# Patient Record
Sex: MALE | Race: WHITE | NOT HISPANIC OR LATINO
[De-identification: names, ages, dates, MRNs, and addresses within clinical notes are randomized per-mention and may not be internally consistent; named-entity substitution may affect disease eponyms.]

---

## 2019-06-20 ENCOUNTER — APPOINTMENT (OUTPATIENT)
Dept: ORTHOPEDIC SURGERY | Facility: CLINIC | Age: 29
End: 2019-06-20
Payer: MEDICAID

## 2019-06-20 VITALS — HEART RATE: 89 BPM | SYSTOLIC BLOOD PRESSURE: 137 MMHG | DIASTOLIC BLOOD PRESSURE: 89 MMHG

## 2019-06-20 VITALS — WEIGHT: 315 LBS | HEIGHT: 69 IN | BODY MASS INDEX: 46.65 KG/M2

## 2019-06-20 DIAGNOSIS — Z78.9 OTHER SPECIFIED HEALTH STATUS: ICD-10-CM

## 2019-06-20 DIAGNOSIS — Z86.73 PERSONAL HISTORY OF TRANSIENT ISCHEMIC ATTACK (TIA), AND CEREBRAL INFARCTION W/OUT RESIDUAL DEFICITS: ICD-10-CM

## 2019-06-20 DIAGNOSIS — Z87.39 PERSONAL HISTORY OF OTHER DISEASES OF THE MUSCULOSKELETAL SYSTEM AND CONNECTIVE TISSUE: ICD-10-CM

## 2019-06-20 DIAGNOSIS — Z86.69 PERSONAL HISTORY OF OTHER DISEASES OF THE NERVOUS SYSTEM AND SENSE ORGANS: ICD-10-CM

## 2019-06-20 DIAGNOSIS — Z82.69 FAMILY HISTORY OF OTHER DISEASES OF THE MUSCULOSKELETAL SYSTEM AND CONNECTIVE TISSUE: ICD-10-CM

## 2019-06-20 DIAGNOSIS — Z56.0 UNEMPLOYMENT, UNSPECIFIED: ICD-10-CM

## 2019-06-20 PROCEDURE — 99204 OFFICE O/P NEW MOD 45 MIN: CPT

## 2019-06-20 PROCEDURE — 72100 X-RAY EXAM L-S SPINE 2/3 VWS: CPT

## 2019-06-20 SDOH — ECONOMIC STABILITY - INCOME SECURITY: UNEMPLOYMENT, UNSPECIFIED: Z56.0

## 2019-07-16 ENCOUNTER — APPOINTMENT (OUTPATIENT)
Dept: NEUROSURGERY | Facility: CLINIC | Age: 29
End: 2019-07-16
Payer: MEDICAID

## 2019-07-16 VITALS
HEIGHT: 69 IN | DIASTOLIC BLOOD PRESSURE: 109 MMHG | HEART RATE: 90 BPM | WEIGHT: 315 LBS | SYSTOLIC BLOOD PRESSURE: 140 MMHG | BODY MASS INDEX: 46.65 KG/M2

## 2019-07-16 PROCEDURE — 99204 OFFICE O/P NEW MOD 45 MIN: CPT

## 2019-07-16 NOTE — PHYSICAL EXAM
[General Appearance - Alert] : alert [Mood] : the mood was normal [Motor Strength] : muscle strength was normal in all four extremities [Sensation Tactile Decrease] : light touch was intact [2+] : Ankle jerk left 2+ [Straight-Leg Raise Test - Left] : straight leg raise of the left leg was negative [Straight-Leg Raise Test - Right] : straight leg raise  of the right leg was negative [Able to toe walk] : the patient was able to toe walk [Able to heel walk] : the patient was able to heel walk [No Spinal Tenderness] : no spinal tenderness [] : no rash

## 2019-07-16 NOTE — ASSESSMENT
[FreeTextEntry1] : 29 year old male with low back pain and lumbar radicular pain secondary to disc herniation.  Given the nature of the patient's complaints and lack of significant improvement following more conservative measures, we did discuss lumbar epidural steroid injection.  Risks, benefits, and expectations of the procedure were reviewed.  The patient was provided with an educational pamphlet outlining the details of the procedure so that he/she may have the ability to review the information prior to proceeding.  The patient has agreed to proceed and will follow up with me for the procedure.\par

## 2019-07-16 NOTE — CONSULT LETTER
[Dear  ___] : Dear ~BARBIE, [Consult Letter:] : I had the pleasure of evaluating your patient, [unfilled]. [Please see my note below.] : Please see my note below. [Consult Closing:] : Thank you very much for allowing me to participate in the care of this patient.  If you have any questions, please do not hesitate to contact me. [Sincerely,] : Sincerely, [FreeTextEntry2] : Brennon Arredondo MD\par [address]\[place] [address] [FreeTextEntry3] : Jesus

## 2019-07-16 NOTE — HISTORY OF PRESENT ILLNESS
[Back] : back [___ yrs] : [unfilled] year(s) ago [8] : a current pain level of 8/10 [10] : a maximum pain level of 10/10 [Dull] : dull [Right] : right [Anterior] : anterior aspect of the [Ankle] : ankle [Weakness] : weakness [Standing] : standing [Walking] : walking [Insomnia] : insomnia [PT] : PT [Chiropractor] : chiropractor [Medications] : medications [FreeTextEntry1] : M [FreeTextEntry2] : right leg [FreeTextEntry4] : leaning on something [FreeTextEntry6] : Nabumetone, Lyrica

## 2019-07-16 NOTE — DATA REVIEWED
[de-identified] : MRI lumbar spine done 5/11/19 showed evidence of L3-L4 disc herniation, L4-L5 disc herniation, L5-S1 disc herniation with S1 impingement [de-identified] : EMG/NCS done 5/23/19 showed evidence of bilateral L3, L4, and L5 radiculopathy

## 2019-07-24 ENCOUNTER — TRANSCRIPTION ENCOUNTER (OUTPATIENT)
Age: 29
End: 2019-07-24

## 2019-07-25 ENCOUNTER — OUTPATIENT (OUTPATIENT)
Dept: OUTPATIENT SERVICES | Facility: HOSPITAL | Age: 29
LOS: 1 days | End: 2019-07-25
Payer: MEDICAID

## 2019-07-25 ENCOUNTER — APPOINTMENT (OUTPATIENT)
Dept: NEUROSURGERY | Facility: CLINIC | Age: 29
End: 2019-07-25
Payer: MEDICAID

## 2019-07-25 DIAGNOSIS — M54.16 RADICULOPATHY, LUMBAR REGION: ICD-10-CM

## 2019-07-25 PROCEDURE — 62323 NJX INTERLAMINAR LMBR/SAC: CPT

## 2019-07-30 DIAGNOSIS — M54.17 RADICULOPATHY, LUMBOSACRAL REGION: ICD-10-CM

## 2019-08-21 ENCOUNTER — APPOINTMENT (OUTPATIENT)
Dept: NEUROSURGERY | Facility: CLINIC | Age: 29
End: 2019-08-21
Payer: MEDICAID

## 2019-08-21 VITALS
WEIGHT: 315 LBS | BODY MASS INDEX: 46.65 KG/M2 | DIASTOLIC BLOOD PRESSURE: 88 MMHG | HEIGHT: 69 IN | SYSTOLIC BLOOD PRESSURE: 141 MMHG | HEART RATE: 86 BPM

## 2019-08-21 DIAGNOSIS — M54.16 RADICULOPATHY, LUMBAR REGION: ICD-10-CM

## 2019-08-21 PROCEDURE — 99213 OFFICE O/P EST LOW 20 MIN: CPT

## 2019-08-21 NOTE — PHYSICAL EXAM
[Mood] : the mood was normal [General Appearance - Alert] : alert [FreeTextEntry1] : no redness, swelling, or tenderness at the injection site

## 2019-08-21 NOTE — REASON FOR VISIT
[Follow-Up: _____] : a [unfilled] follow-up visit [FreeTextEntry1] : Patient returns after his first LESI a few weeks ago.  Unfortunately, he does not feel any improvement following this procedure.  He is here to discuss his progress.

## 2019-08-21 NOTE — ASSESSMENT
[FreeTextEntry1] : 29 year old male with low back and lumbar radicular pain not improved following his first LESI.  He will contemplate whether or not to proceed with a repeat injection and follow up with me thereafter.

## 2019-09-10 ENCOUNTER — APPOINTMENT (OUTPATIENT)
Dept: ORTHOPEDIC SURGERY | Facility: CLINIC | Age: 29
End: 2019-09-10

## 2019-10-03 ENCOUNTER — APPOINTMENT (OUTPATIENT)
Dept: ORTHOPEDIC SURGERY | Facility: CLINIC | Age: 29
End: 2019-10-03

## 2019-10-17 ENCOUNTER — APPOINTMENT (OUTPATIENT)
Dept: ORTHOPEDIC SURGERY | Facility: CLINIC | Age: 29
End: 2019-10-17

## 2019-10-31 ENCOUNTER — APPOINTMENT (OUTPATIENT)
Dept: ORTHOPEDIC SURGERY | Facility: CLINIC | Age: 29
End: 2019-10-31
Payer: MEDICAID

## 2019-10-31 PROCEDURE — 99215 OFFICE O/P EST HI 40 MIN: CPT

## 2019-11-15 ENCOUNTER — FORM ENCOUNTER (OUTPATIENT)
Age: 29
End: 2019-11-15

## 2019-11-16 ENCOUNTER — APPOINTMENT (OUTPATIENT)
Dept: MRI IMAGING | Facility: CLINIC | Age: 29
End: 2019-11-16
Payer: MEDICAID

## 2019-11-16 ENCOUNTER — OUTPATIENT (OUTPATIENT)
Dept: OUTPATIENT SERVICES | Facility: HOSPITAL | Age: 29
LOS: 1 days | End: 2019-11-16
Payer: MEDICAID

## 2019-11-16 DIAGNOSIS — M51.26 OTHER INTERVERTEBRAL DISC DISPLACEMENT, LUMBAR REGION: ICD-10-CM

## 2019-11-16 PROCEDURE — 72148 MRI LUMBAR SPINE W/O DYE: CPT | Mod: 26

## 2019-11-16 PROCEDURE — 72148 MRI LUMBAR SPINE W/O DYE: CPT

## 2019-11-22 ENCOUNTER — APPOINTMENT (OUTPATIENT)
Dept: ORTHOPEDIC SURGERY | Facility: CLINIC | Age: 29
End: 2019-11-22
Payer: MEDICAID

## 2019-11-22 DIAGNOSIS — M48.07 SPINAL STENOSIS, LUMBOSACRAL REGION: ICD-10-CM

## 2019-11-22 DIAGNOSIS — M51.26 OTHER INTERVERTEBRAL DISC DISPLACEMENT, LUMBAR REGION: ICD-10-CM

## 2019-11-22 PROCEDURE — 99215 OFFICE O/P EST HI 40 MIN: CPT

## 2019-11-22 NOTE — DISCUSSION/SUMMARY
[de-identified] : 30 yo male with multilevel disc protrusion and spinal stenosis from L3-L4 through L45 causing central canal stenosis, epidural lipomatosis, , recommend lumbar laminectomy L3-L5 as he has failed PT, NSAIDS, and TACO. \par \par \par I reviewed all major risks, benefits, and complications associated with lumbar laminectomy and/or microdiscectomy including but not limited to bleeding, infection, CSF leak, neurological injury, chronic pain, reherniation, hematoma worsening of pain, anesthetic risk, chronic pain and disability, need for further surgical intervention, medical complications (GI, , DVT, PE, cardiac, pulmonary, etc) and risk of mortality.\par \par \par Diagnosis, prognosis, natural history and treatment was discussed with patient. Patient was advised if the following symptoms develop: chills, fever, loss of bladder control, bladder incontinence or urinary retention, numbness/tingling or weakness is present in upper or lower extremities, to go to the nearest ER and to call the office immediately to inform us.\par \par

## 2019-11-22 NOTE — HISTORY OF PRESENT ILLNESS
[de-identified] : Pt with no changes in his symptomatology since last office visit on 10/31/19 who has L34, L45 HNP, lateral recess stenosis, epidural lipomatosis. He presents today for f/u. Pt had LESI on 07/25/19, performed by Dr Hurst, this provided no relief in pain\par Pain radiates to RLE to ankle, weakness, Pt denies numbness/tingling. Pt takes Lyrica, nabumetone, these provides no relief in pain. Pt completed PT 1 yr ago, this caused exacerbation of pain. Pt denies having TACO in the past. Pt denies Loss of bladder control, bowel incontinence or urinary retention or saddle anesthesia\par \par Pain levels include an average pain level of 8/10, a minimum pain level of 7/10 and a maximum pain level of 9/10. \par He states the symptoms seem to be constant. \par \par Modifying factors -. rainy weather exacerbates pain. No relieving factors are noted. \par Associated Symptoms: no ataxia, no incontinence, good dexterity and no urinary retention.

## 2019-11-22 NOTE — PHYSICAL EXAM
[SLR] : positive straight leg raise [NL] : Motor strength of the left lower extremity was normal [Motor Strength Lower Extremities] : right (5/5) [] : Sensory: [L3-RT] : L3 [L4-RT] : L4 [L5-RT] : L5 [0] : left ankle jerk 0 [ALL] : dorsalis pedis, posterior tibial, femoral, popliteal, and radial 2+ and symmetric bilaterally [Obese] : obese [Normal] : Oriented to person, place, and time, insight and judgement were intact and the affect was normal [Jaimes's Sign] : negative Jaimes's sign [Pronator Drift] : negative pronator drift [de-identified] : Lumbar ROM limited\par NTTP L spine and b/l paraspinals L region. \par Skin intact L spine. No rashes, ulcers, blisters. \par No lymphedema. \par \par  [de-identified] : EXAM: MR SPINE LUMBAR \par \par \par PROCEDURE DATE: 11/16/2019 \par \par \par INTERPRETATION: \par LUMBOSACRAL SPINE MRI \par \par CLINICAL INFORMATION: Low back pain and right leg pain. \par TECHNIQUE: Multiplanar, multisequence MR imaging was obtained of the \par lumbosacral spine. \par FINDINGS: \par \par DISC LEVEL EVALUATION: \par \par L1/L2: Normal \par L2/L3: Small posterior disc protrusion without central canal or foraminal \par narrowing. \par L3/L4: There is a small to moderate central disc protrusion with associated \par annular fissuring causing mild to moderate central canal stenosis. \par L4/L5: There is a small to moderate central disc protrusion with associated \par annular fissuring causing mild central canal stenosis. There is minimal \par foraminal narrowing. Mild facet arthrosis is present. \par L5/S1: There is a small to moderate broad-based posterior disc protrusion \par that minimally indents the ventral thecal sac causing minimal central canal \par stenosis. There is no foraminal narrowing. \par \par SPINAL ALIGNMENT: Normal \par DISTAL CORD AND CONUS: The distal cord is normal in appearance. The conus \par terminates at L1 and is unremarkable. \par SI JOINTS: Normal \par MARROW: There is no bone marrow edema or fracture. \par PARASPINAL MUSCLE AND SOFT TISSUES: Normal \par INTRAABDOMINAL/INTRAPELVIC SOFT TISSUES: Normal \par \par IMPRESSION: Multilevel disc protrusion from L3-L4 through L5-S1 causing \par central canal stenosis as detailed above. \par \par \par PUNEET VALDEZ M.D., ATTENDING RADIOLOGIST Phone #: [phone] \par This document has been electronically signed. Nov 18 2019 12:54PM \par

## 2019-11-29 ENCOUNTER — RX RENEWAL (OUTPATIENT)
Age: 29
End: 2019-11-29

## 2019-11-29 RX ORDER — NABUMETONE 750 MG/1
750 TABLET, FILM COATED ORAL
Qty: 28 | Refills: 0 | Status: ACTIVE | COMMUNITY
Start: 1900-01-01 | End: 1900-01-01

## 2019-11-29 RX ORDER — PREGABALIN 50 MG/1
50 CAPSULE ORAL 3 TIMES DAILY
Qty: 42 | Refills: 0 | Status: ACTIVE | COMMUNITY
Start: 1900-01-01 | End: 1900-01-01

## 2019-12-09 ENCOUNTER — RX RENEWAL (OUTPATIENT)
Age: 29
End: 2019-12-09

## 2019-12-10 ENCOUNTER — APPOINTMENT (OUTPATIENT)
Dept: ORTHOPEDIC SURGERY | Facility: CLINIC | Age: 29
End: 2019-12-10

## 2020-01-06 ENCOUNTER — OUTPATIENT (OUTPATIENT)
Dept: OUTPATIENT SERVICES | Facility: HOSPITAL | Age: 30
LOS: 1 days | End: 2020-01-06
Payer: MEDICAID

## 2020-01-06 VITALS
WEIGHT: 315 LBS | HEIGHT: 70 IN | SYSTOLIC BLOOD PRESSURE: 140 MMHG | RESPIRATION RATE: 16 BRPM | DIASTOLIC BLOOD PRESSURE: 85 MMHG | OXYGEN SATURATION: 98 % | HEART RATE: 89 BPM | TEMPERATURE: 99 F

## 2020-01-06 DIAGNOSIS — Z01.818 ENCOUNTER FOR OTHER PREPROCEDURAL EXAMINATION: ICD-10-CM

## 2020-01-06 DIAGNOSIS — Z90.89 ACQUIRED ABSENCE OF OTHER ORGANS: Chronic | ICD-10-CM

## 2020-01-06 DIAGNOSIS — M53.86 OTHER SPECIFIED DORSOPATHIES, LUMBAR REGION: ICD-10-CM

## 2020-01-06 DIAGNOSIS — Z87.438 PERSONAL HISTORY OF OTHER DISEASES OF MALE GENITAL ORGANS: Chronic | ICD-10-CM

## 2020-01-06 DIAGNOSIS — M48.07 SPINAL STENOSIS, LUMBOSACRAL REGION: ICD-10-CM

## 2020-01-06 DIAGNOSIS — Z29.9 ENCOUNTER FOR PROPHYLACTIC MEASURES, UNSPECIFIED: ICD-10-CM

## 2020-01-06 DIAGNOSIS — E66.01 MORBID (SEVERE) OBESITY DUE TO EXCESS CALORIES: ICD-10-CM

## 2020-01-06 LAB
ANION GAP SERPL CALC-SCNC: 11 MMOL/L — SIGNIFICANT CHANGE UP (ref 5–17)
BUN SERPL-MCNC: 15 MG/DL — SIGNIFICANT CHANGE UP (ref 7–23)
CALCIUM SERPL-MCNC: 9.4 MG/DL — SIGNIFICANT CHANGE UP (ref 8.4–10.5)
CHLORIDE SERPL-SCNC: 99 MMOL/L — SIGNIFICANT CHANGE UP (ref 96–108)
CO2 SERPL-SCNC: 26 MMOL/L — SIGNIFICANT CHANGE UP (ref 22–31)
CREAT SERPL-MCNC: 1.01 MG/DL — SIGNIFICANT CHANGE UP (ref 0.5–1.3)
GLUCOSE SERPL-MCNC: 109 MG/DL — HIGH (ref 70–99)
HCT VFR BLD CALC: 44.9 % — SIGNIFICANT CHANGE UP (ref 39–50)
HGB BLD-MCNC: 14.6 G/DL — SIGNIFICANT CHANGE UP (ref 13–17)
MCHC RBC-ENTMCNC: 27.3 PG — SIGNIFICANT CHANGE UP (ref 27–34)
MCHC RBC-ENTMCNC: 32.5 GM/DL — SIGNIFICANT CHANGE UP (ref 32–36)
MCV RBC AUTO: 83.9 FL — SIGNIFICANT CHANGE UP (ref 80–100)
PLATELET # BLD AUTO: 264 K/UL — SIGNIFICANT CHANGE UP (ref 150–400)
POTASSIUM SERPL-MCNC: 4.2 MMOL/L — SIGNIFICANT CHANGE UP (ref 3.5–5.3)
POTASSIUM SERPL-SCNC: 4.2 MMOL/L — SIGNIFICANT CHANGE UP (ref 3.5–5.3)
RBC # BLD: 5.35 M/UL — SIGNIFICANT CHANGE UP (ref 4.2–5.8)
RBC # FLD: 12.1 % — SIGNIFICANT CHANGE UP (ref 10.3–14.5)
SODIUM SERPL-SCNC: 136 MMOL/L — SIGNIFICANT CHANGE UP (ref 135–145)
WBC # BLD: 8.56 K/UL — SIGNIFICANT CHANGE UP (ref 3.8–10.5)
WBC # FLD AUTO: 8.56 K/UL — SIGNIFICANT CHANGE UP (ref 3.8–10.5)

## 2020-01-06 PROCEDURE — 87641 MR-STAPH DNA AMP PROBE: CPT

## 2020-01-06 PROCEDURE — 80048 BASIC METABOLIC PNL TOTAL CA: CPT

## 2020-01-06 PROCEDURE — G0463: CPT

## 2020-01-06 PROCEDURE — 87640 STAPH A DNA AMP PROBE: CPT

## 2020-01-06 PROCEDURE — 85027 COMPLETE CBC AUTOMATED: CPT

## 2020-01-06 RX ORDER — CEFAZOLIN SODIUM 1 G
3000 VIAL (EA) INJECTION ONCE
Refills: 0 | Status: DISCONTINUED | OUTPATIENT
Start: 2020-01-18 | End: 2020-01-19

## 2020-01-06 RX ORDER — SODIUM CHLORIDE 9 MG/ML
3 INJECTION INTRAMUSCULAR; INTRAVENOUS; SUBCUTANEOUS EVERY 8 HOURS
Refills: 0 | Status: DISCONTINUED | OUTPATIENT
Start: 2020-01-18 | End: 2020-01-18

## 2020-01-06 RX ORDER — LIDOCAINE HCL 20 MG/ML
0.2 VIAL (ML) INJECTION ONCE
Refills: 0 | Status: DISCONTINUED | OUTPATIENT
Start: 2020-01-18 | End: 2020-01-18

## 2020-01-06 RX ORDER — CHLORHEXIDINE GLUCONATE 213 G/1000ML
1 SOLUTION TOPICAL ONCE
Refills: 0 | Status: DISCONTINUED | OUTPATIENT
Start: 2020-01-18 | End: 2020-01-18

## 2020-01-06 NOTE — H&P PST ADULT - HISTORY OF PRESENT ILLNESS
29 yr old male reports worsening low back pain over past 3 years, pain radiation to right leg, able to walk, perform moderate house work , presents to 29 yr old male, obese , BMI 46.8,  reports worsening low back pain over past 3 years, pain radiation to right leg, able to walk without assistance, perform moderate house work , limited daily activity,  presents to PST for scheduled L3-L5 right hemilaminotomy and discectomy on 1/18/2019. Denies fever, chills, no acute complaints.

## 2020-01-06 NOTE — H&P PST ADULT - ASSESSMENT
CHACORTAI VTE 2.0 SCORE [CLOT updated 2019]    AGE RELATED RISK FACTORS                                                       MOBILITY RELATED FACTORS  [x ] Age 41-60 years                                            (1 Point)                    [ ] Bed rest                                                        (1 Point)  [ ] Age: 61-74 years                                           (2 Points)                  [ ] Plaster cast                                                   (2 Points)  [ ] Age= 75 years                                              (3 Points)                    [ ] Bed bound for more than 72 hours                 (2 Points)    DISEASE RELATED RISK FACTORS                                               GENDER SPECIFIC FACTORS  [ ] Edema in the lower extremities                       (1 Point)              [ ] Pregnancy                                                     (1 Point)  [ ] Varicose veins                                               (1 Point)                     [ ] Post-partum < 6 weeks                                   (1 Point)             [x ] BMI > 25 Kg/m2                                            (1 Point)                     [ ] Hormonal therapy  or oral contraception          (1 Point)                 [ ] Sepsis (in the previous month)                        (1 Point)               [ ] History of pregnancy complications                 (1 point)  [ ] Pneumonia or serious lung disease                                               [ ] Unexplained or recurrent                     (1 Point)           (in the previous month)                               (1 Point)  [ ] Abnormal pulmonary function test                     (1 Point)                 SURGERY RELATED RISK FACTORS  [ ] Acute myocardial infarction                              (1 Point)               [ ]  Section                                             (1 Point)  [ ] Congestive heart failure (in the previous month)  (1 Point)      [ ] Minor surgery                                                  (1 Point)   [ ] Inflammatory bowel disease                             (1 Point)               [ ] Arthroscopic surgery                                        (2 Points)  [ ] Central venous access                                      (2 Points)                [x ] General surgery lasting more than 45 minutes (2 points)  [ ] Malignancy- Present or previous                   (2 Points)                [ ] Elective arthroplasty                                         (5 points)    [ ] Stroke (in the previous month)                          (5 Points)                                                                                                                                                           HEMATOLOGY RELATED FACTORS                                                 TRAUMA RELATED RISK FACTORS  [ ] Prior episodes of VTE                                     (3 Points)                [ ] Fracture of the hip, pelvis, or leg                       (5 Points)  [ ] Positive family history for VTE                         (3 Points)             [ ] Acute spinal cord injury (in the previous month)  (5 Points)  [ ] Prothrombin 23309 A                                     (3 Points)               [ ] Paralysis  (less than 1 month)                             (5 Points)  [ ] Factor V Leiden                                             (3 Points)                  [ ] Multiple Trauma within 1 month                        (5 Points)  [ ] Lupus anticoagulants                                     (3 Points)                                                           [ ] Anticardiolipin antibodies                               (3 Points)                                                       [ ] High homocysteine in the blood                      (3 Points)                                             [ ] Other congenital or acquired thrombophilia      (3 Points)                                                [ ] Heparin induced thrombocytopenia                  (3 Points)                                     Total Score [      4    ]

## 2020-01-06 NOTE — H&P PST ADULT - NSICDXPROBLEM_GEN_ALL_CORE_FT
PROBLEM DIAGNOSES  Problem: Disorder of lumbar spine  Assessment and Plan: L3-L5 right hemilaminotomy and discectomy  PST instructions provided, surgical scrub given, questions answered, patient verbalized understanding.   CBC, BMP, MRSA collected and sent.     Problem: Need for prophylactic measure  Assessment and Plan: The Caprini score indicates this patient is at risk for a VTE event (score 3-5).  Most surgical patients in this group would benefit from pharmacologic prophylaxis.  The surgical team will determine the balance between VTE risk and bleeding risk PROBLEM DIAGNOSES  Problem: Disorder of lumbar spine  Assessment and Plan: L3-L5 right hemilaminotomy and discectomy  PST instructions provided, surgical scrub given, questions answered, patient verbalized understanding.   CBC, BMP, MRSA collected and sent.     Problem: Severe obesity (BMI >= 40)  Assessment and Plan: BMI 46.8  OR booking notified     Problem: Need for prophylactic measure  Assessment and Plan: The Caprini score indicates this patient is at risk for a VTE event (score 3-5).  Most surgical patients in this group would benefit from pharmacologic prophylaxis.  The surgical team will determine the balance between VTE risk and bleeding risk PROBLEM DIAGNOSES  Problem: Disorder of lumbar spine  Assessment and Plan: L3-L5 right hemilaminotomy and discectomy  PST instructions provided, surgical scrub given, questions answered, patient verbalized understanding.   CBC, BMP, MRSA collected and sent.  Will obtain PCP and neurology last visit notes      Problem: Severe obesity (BMI >= 40)  Assessment and Plan: BMI 46.8  OR booking notified     Problem: Need for prophylactic measure  Assessment and Plan: The Caprini score indicates this patient is at risk for a VTE event (score 3-5).  Most surgical patients in this group would benefit from pharmacologic prophylaxis.  The surgical team will determine the balance between VTE risk and bleeding risk

## 2020-01-06 NOTE — H&P PST ADULT - NSICDXPASTMEDICALHX_GEN_ALL_CORE_FT
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

## 2020-01-06 NOTE — H&P PST ADULT - NSANTHOSAYNRD_GEN_A_CORE
No. JORGE screening performed.  STOP BANG Legend: 0-2 = LOW Risk; 3-4 = INTERMEDIATE Risk; 5-8 = HIGH Risk neck 18 inches/No. JORGE screening performed.  STOP BANG Legend: 0-2 = LOW Risk; 3-4 = INTERMEDIATE Risk; 5-8 = HIGH Risk

## 2020-01-06 NOTE — H&P PST ADULT - PRIMARY CARE PROVIDER
Dr. Jamie Larkin [phone] last visit 12/16/19 Dr. Jamie Larkin [phone] last visit 12/16/19, no preop visit

## 2020-01-06 NOTE — H&P PST ADULT - ATTENDING COMMENTS
28 yo male with multilevel disc protrusion and spinal stenosis from L3-L4 through L45 causing central canal stenosis, epidural lipomatosis, , recommend lumbar laminectomy L3-L5 as he has failed PT, NSAIDS, and TACO.       I reviewed all major risks, benefits, and complications associated with lumbar laminectomy and/or microdiscectomy including but not limited to bleeding, infection, CSF leak, neurological injury, chronic pain, reherniation, hematoma worsening of pain, anesthetic risk, chronic pain and disability, need for further surgical intervention, medical complications (GI, , DVT, PE, cardiac, pulmonary, etc) and risk of mortality.

## 2020-01-06 NOTE — H&P PST ADULT - ENERGY EXPENDITURE (METS)
3 yr ago, was very active, activity decreased over past years due to pain 3 yr ago, was very active, activity decreased over past years due to pain, DASI score  now  4.64

## 2020-01-07 ENCOUNTER — RX RENEWAL (OUTPATIENT)
Age: 30
End: 2020-01-07

## 2020-01-07 LAB
MRSA PCR RESULT.: SIGNIFICANT CHANGE UP
S AUREUS DNA NOSE QL NAA+PROBE: DETECTED

## 2020-01-07 RX ORDER — MUPIROCIN 20 MG/G
2 OINTMENT TOPICAL
Qty: 2 | Refills: 0 | Status: ACTIVE | COMMUNITY
Start: 2020-01-07 | End: 1900-01-01

## 2020-01-17 ENCOUNTER — TRANSCRIPTION ENCOUNTER (OUTPATIENT)
Age: 30
End: 2020-01-17

## 2020-01-18 ENCOUNTER — APPOINTMENT (OUTPATIENT)
Dept: ORTHOPEDIC SURGERY | Facility: HOSPITAL | Age: 30
End: 2020-01-18

## 2020-01-18 ENCOUNTER — RESULT REVIEW (OUTPATIENT)
Age: 30
End: 2020-01-18

## 2020-01-18 ENCOUNTER — INPATIENT (INPATIENT)
Facility: HOSPITAL | Age: 30
LOS: 0 days | Discharge: HOME | DRG: 519 | End: 2020-01-19
Attending: ORTHOPAEDIC SURGERY | Admitting: ORTHOPAEDIC SURGERY
Payer: MEDICAID

## 2020-01-18 VITALS
RESPIRATION RATE: 18 BRPM | SYSTOLIC BLOOD PRESSURE: 131 MMHG | WEIGHT: 315 LBS | TEMPERATURE: 97 F | OXYGEN SATURATION: 100 % | HEART RATE: 91 BPM | DIASTOLIC BLOOD PRESSURE: 89 MMHG | HEIGHT: 70 IN

## 2020-01-18 DIAGNOSIS — Z87.438 PERSONAL HISTORY OF OTHER DISEASES OF MALE GENITAL ORGANS: Chronic | ICD-10-CM

## 2020-01-18 DIAGNOSIS — Z90.89 ACQUIRED ABSENCE OF OTHER ORGANS: Chronic | ICD-10-CM

## 2020-01-18 DIAGNOSIS — M48.07 SPINAL STENOSIS, LUMBOSACRAL REGION: ICD-10-CM

## 2020-01-18 LAB
ANION GAP SERPL CALC-SCNC: 12 MMOL/L — SIGNIFICANT CHANGE UP (ref 5–17)
BASOPHILS # BLD AUTO: 0.02 K/UL — SIGNIFICANT CHANGE UP (ref 0–0.2)
BASOPHILS NFR BLD AUTO: 0.2 % — SIGNIFICANT CHANGE UP (ref 0–2)
BLD GP AB SCN SERPL QL: NEGATIVE — SIGNIFICANT CHANGE UP
BUN SERPL-MCNC: 16 MG/DL — SIGNIFICANT CHANGE UP (ref 7–23)
CALCIUM SERPL-MCNC: 8.5 MG/DL — SIGNIFICANT CHANGE UP (ref 8.4–10.5)
CHLORIDE SERPL-SCNC: 101 MMOL/L — SIGNIFICANT CHANGE UP (ref 96–108)
CO2 SERPL-SCNC: 23 MMOL/L — SIGNIFICANT CHANGE UP (ref 22–31)
CREAT SERPL-MCNC: 1.11 MG/DL — SIGNIFICANT CHANGE UP (ref 0.5–1.3)
EOSINOPHIL # BLD AUTO: 0.02 K/UL — SIGNIFICANT CHANGE UP (ref 0–0.5)
EOSINOPHIL NFR BLD AUTO: 0.2 % — SIGNIFICANT CHANGE UP (ref 0–6)
GLUCOSE SERPL-MCNC: 164 MG/DL — HIGH (ref 70–99)
HCT VFR BLD CALC: 41 % — SIGNIFICANT CHANGE UP (ref 39–50)
HGB BLD-MCNC: 13.5 G/DL — SIGNIFICANT CHANGE UP (ref 13–17)
IMM GRANULOCYTES NFR BLD AUTO: 0.5 % — SIGNIFICANT CHANGE UP (ref 0–1.5)
LYMPHOCYTES # BLD AUTO: 0.82 K/UL — LOW (ref 1–3.3)
LYMPHOCYTES # BLD AUTO: 10.1 % — LOW (ref 13–44)
MCHC RBC-ENTMCNC: 27.2 PG — SIGNIFICANT CHANGE UP (ref 27–34)
MCHC RBC-ENTMCNC: 32.9 GM/DL — SIGNIFICANT CHANGE UP (ref 32–36)
MCV RBC AUTO: 82.5 FL — SIGNIFICANT CHANGE UP (ref 80–100)
MONOCYTES # BLD AUTO: 0.16 K/UL — SIGNIFICANT CHANGE UP (ref 0–0.9)
MONOCYTES NFR BLD AUTO: 2 % — SIGNIFICANT CHANGE UP (ref 2–14)
NEUTROPHILS # BLD AUTO: 7.05 K/UL — SIGNIFICANT CHANGE UP (ref 1.8–7.4)
NEUTROPHILS NFR BLD AUTO: 87 % — HIGH (ref 43–77)
NRBC # BLD: 0 /100 WBCS — SIGNIFICANT CHANGE UP (ref 0–0)
PLATELET # BLD AUTO: 246 K/UL — SIGNIFICANT CHANGE UP (ref 150–400)
POTASSIUM SERPL-MCNC: 4.3 MMOL/L — SIGNIFICANT CHANGE UP (ref 3.5–5.3)
POTASSIUM SERPL-SCNC: 4.3 MMOL/L — SIGNIFICANT CHANGE UP (ref 3.5–5.3)
RBC # BLD: 4.97 M/UL — SIGNIFICANT CHANGE UP (ref 4.2–5.8)
RBC # FLD: 12 % — SIGNIFICANT CHANGE UP (ref 10.3–14.5)
RH IG SCN BLD-IMP: POSITIVE — SIGNIFICANT CHANGE UP
RH IG SCN BLD-IMP: POSITIVE — SIGNIFICANT CHANGE UP
SODIUM SERPL-SCNC: 136 MMOL/L — SIGNIFICANT CHANGE UP (ref 135–145)
WBC # BLD: 8.11 K/UL — SIGNIFICANT CHANGE UP (ref 3.8–10.5)
WBC # FLD AUTO: 8.11 K/UL — SIGNIFICANT CHANGE UP (ref 3.8–10.5)

## 2020-01-18 PROCEDURE — 63047 LAM FACETEC & FORAMOT LUMBAR: CPT | Mod: 22

## 2020-01-18 PROCEDURE — 63048 LAM FACETEC &FORAMOT EA ADDL: CPT | Mod: 22

## 2020-01-18 PROCEDURE — 88311 DECALCIFY TISSUE: CPT | Mod: 26

## 2020-01-18 PROCEDURE — 72100 X-RAY EXAM L-S SPINE 2/3 VWS: CPT | Mod: 26

## 2020-01-18 PROCEDURE — 88304 TISSUE EXAM BY PATHOLOGIST: CPT | Mod: 26

## 2020-01-18 RX ORDER — ACETAMINOPHEN 500 MG
975 TABLET ORAL EVERY 6 HOURS
Refills: 0 | Status: DISCONTINUED | OUTPATIENT
Start: 2020-01-18 | End: 2020-01-19

## 2020-01-18 RX ORDER — SENNA PLUS 8.6 MG/1
2 TABLET ORAL AT BEDTIME
Refills: 0 | Status: DISCONTINUED | OUTPATIENT
Start: 2020-01-18 | End: 2020-01-19

## 2020-01-18 RX ORDER — CYCLOBENZAPRINE HYDROCHLORIDE 10 MG/1
10 TABLET, FILM COATED ORAL THREE TIMES A DAY
Refills: 0 | Status: DISCONTINUED | OUTPATIENT
Start: 2020-01-18 | End: 2020-01-19

## 2020-01-18 RX ORDER — OXYCODONE HYDROCHLORIDE 5 MG/1
10 TABLET ORAL EVERY 4 HOURS
Refills: 0 | Status: DISCONTINUED | OUTPATIENT
Start: 2020-01-18 | End: 2020-01-19

## 2020-01-18 RX ORDER — DEXAMETHASONE 0.5 MG/5ML
1 ELIXIR ORAL
Refills: 0 | Status: DISCONTINUED | OUTPATIENT
Start: 2020-01-18 | End: 2020-01-19

## 2020-01-18 RX ORDER — SODIUM CHLORIDE 9 MG/ML
1000 INJECTION, SOLUTION INTRAVENOUS
Refills: 0 | Status: DISCONTINUED | OUTPATIENT
Start: 2020-01-18 | End: 2020-01-19

## 2020-01-18 RX ORDER — OXYCODONE HYDROCHLORIDE 5 MG/1
5 TABLET ORAL EVERY 4 HOURS
Refills: 0 | Status: DISCONTINUED | OUTPATIENT
Start: 2020-01-18 | End: 2020-01-19

## 2020-01-18 RX ORDER — NABUMETONE 750 MG
2 TABLET ORAL
Qty: 0 | Refills: 0 | DISCHARGE

## 2020-01-18 RX ORDER — LANOLIN ALCOHOL/MO/W.PET/CERES
3 CREAM (GRAM) TOPICAL AT BEDTIME
Refills: 0 | Status: DISCONTINUED | OUTPATIENT
Start: 2020-01-18 | End: 2020-01-19

## 2020-01-18 RX ORDER — ACETAMINOPHEN 500 MG
1000 TABLET ORAL EVERY 6 HOURS
Refills: 0 | Status: COMPLETED | OUTPATIENT
Start: 2020-01-18 | End: 2020-01-18

## 2020-01-18 RX ORDER — FENTANYL CITRATE 50 UG/ML
25 INJECTION INTRAVENOUS
Refills: 0 | Status: DISCONTINUED | OUTPATIENT
Start: 2020-01-18 | End: 2020-01-18

## 2020-01-18 RX ORDER — PANTOPRAZOLE SODIUM 20 MG/1
40 TABLET, DELAYED RELEASE ORAL DAILY
Refills: 0 | Status: DISCONTINUED | OUTPATIENT
Start: 2020-01-18 | End: 2020-01-19

## 2020-01-18 RX ORDER — MAGNESIUM HYDROXIDE 400 MG/1
30 TABLET, CHEWABLE ORAL EVERY 12 HOURS
Refills: 0 | Status: DISCONTINUED | OUTPATIENT
Start: 2020-01-18 | End: 2020-01-19

## 2020-01-18 RX ORDER — BENZOCAINE AND MENTHOL 5; 1 G/100ML; G/100ML
1 LIQUID ORAL
Refills: 0 | Status: DISCONTINUED | OUTPATIENT
Start: 2020-01-18 | End: 2020-01-19

## 2020-01-18 RX ORDER — TRAMADOL HYDROCHLORIDE 50 MG/1
50 TABLET ORAL EVERY 6 HOURS
Refills: 0 | Status: DISCONTINUED | OUTPATIENT
Start: 2020-01-18 | End: 2020-01-19

## 2020-01-18 RX ORDER — DEXAMETHASONE 0.5 MG/5ML
3 ELIXIR ORAL
Refills: 0 | Status: DISCONTINUED | OUTPATIENT
Start: 2020-01-18 | End: 2020-01-19

## 2020-01-18 RX ORDER — CEFAZOLIN SODIUM 1 G
3000 VIAL (EA) INJECTION EVERY 8 HOURS
Refills: 0 | Status: COMPLETED | OUTPATIENT
Start: 2020-01-18 | End: 2020-01-19

## 2020-01-18 RX ORDER — ONDANSETRON 8 MG/1
4 TABLET, FILM COATED ORAL EVERY 8 HOURS
Refills: 0 | Status: DISCONTINUED | OUTPATIENT
Start: 2020-01-18 | End: 2020-01-18

## 2020-01-18 RX ORDER — DEXAMETHASONE 0.5 MG/5ML
5 ELIXIR ORAL
Refills: 0 | Status: COMPLETED | OUTPATIENT
Start: 2020-01-18 | End: 2020-01-19

## 2020-01-18 RX ADMIN — Medication 400 MILLIGRAM(S): at 21:14

## 2020-01-18 RX ADMIN — SODIUM CHLORIDE 125 MILLILITER(S): 9 INJECTION, SOLUTION INTRAVENOUS at 17:39

## 2020-01-18 RX ADMIN — Medication 101 MILLIGRAM(S): at 17:35

## 2020-01-18 RX ADMIN — SENNA PLUS 2 TABLET(S): 8.6 TABLET ORAL at 21:19

## 2020-01-18 RX ADMIN — Medication 75 MILLIGRAM(S): at 17:38

## 2020-01-18 RX ADMIN — Medication 101 MILLIGRAM(S): at 22:34

## 2020-01-18 RX ADMIN — Medication 200 MILLIGRAM(S): at 19:59

## 2020-01-18 RX ADMIN — Medication 1000 MILLIGRAM(S): at 21:45

## 2020-01-18 NOTE — PROGRESS NOTE ADULT - ASSESSMENT
Pt is a 29y Male POD 0 from RLE Hemilaminotomy.  -Stable  -Tolerated procedure well  -Pain Control  -Hold all chemical DVT PPx in setting of spinal surgery  -SCDs  -Taper  -Encourage Incentive Spirometry  -WBAT  -PT/OT  -Med Management  -Dispo Planning--home tomorrow  -Will discuss with attending and advise if plan changes    Sarai Carson M.D.  PGY-2 Orthopaedic Surgery

## 2020-01-18 NOTE — PRE-OP CHECKLIST - HAIR REMOVAL
Detail Level: Simple
hair removal not indicated
Instructions (Optional): Continue with treatment as planned 10/24/2017

## 2020-01-18 NOTE — PRE-ANESTHESIA EVALUATION ADULT - NSANTHPMHFT_GEN_ALL_CORE
29 yr old male.  Pmhx obese (BMI 46.8), seizure hx, pseudomotor cerebri.  Reports worsening low back pain over past 3 years, pain radiation to right leg, able to walk without assistance, perform moderate house work , limited daily activity.  Presents for scheduled L3-L5 right hemilaminotomy and discectomy. 29 yr old male.  Pmhx obese (BMI 46.8), ?CVA and seizures as a baby and no seizures since age 5 and not currently on anti seizure meds.  Reports worsening low back pain over past 3 years, pain radiation to right leg, able to walk without assistance, perform moderate house work , limited daily activity.  Presents for scheduled L3-L5 right hemilaminotomy and discectomy.

## 2020-01-18 NOTE — PRE-ANESTHESIA EVALUATION ADULT - NSANTHOSAYNRD_GEN_A_CORE
No. JORGE screening performed.  STOP BANG Legend: 0-2 = LOW Risk; 3-4 = INTERMEDIATE Risk; 5-8 = HIGH Risk/neck 18 inches

## 2020-01-18 NOTE — PROGRESS NOTE ADULT - SUBJECTIVE AND OBJECTIVE BOX
Orthopaedic Surgery Post-Operative Progress Note    Pt reports pain is well controlled. Tolerated procedure well. Denies fevers, dizziness, CP, SOB, N/V, numbness/tingling, weakness, calf pain.    Labs:                        13.5   8.11  )-----------( 246      ( 18 Jan 2020 16:39 )             41.0     01-18    136  |  101  |  16  ----------------------------<  164<H>  4.3   |  23  |  1.11    Ca    8.5      18 Jan 2020 16:39    Vitals:  T(C): 37.1 (01-18-20 @ 22:04), Max: 37.1 (01-18-20 @ 22:04)  HR: 101 (01-18-20 @ 22:04) (88 - 106)  BP: 120/67 (01-18-20 @ 22:04) (117/95 - 142/91)  RR: 18 (01-18-20 @ 22:04) (16 - 18)  SpO2: 95% (01-18-20 @ 22:04) (95% - 100%)    Physical Exam:  Gen: NAD, resting comfortably    Spine Exam:  Dressing clean, dry, intact  Negative clonus  Negative babinski  Negative ross  Negative ulnar drift  No calf TTP    Motor:               C5           C6            C7               C8           T1   R         5/5          5/5            5/5             5/5          5/5  L          5/5          5/5            5/5             5/5          5/5                L2             L3             L4               L5            S1  R         5/5           5/5          5/5             5/5           5/5  L          5/5          5/5           5/5             5/5           5/5    Sensory:            C5         C6         C7      C8       T1        (0=absent, 1=impaired, 2=normal, NT=not testable)  R         2            2           2        2         2  L          2            2           2        2         2               L2          L3         L4      L5       S1         (0=absent, 1=impaired, 2=normal, NT=not testable)  R         2            2            2        2        2  L          2            2           2        2         2

## 2020-01-19 ENCOUNTER — TRANSCRIPTION ENCOUNTER (OUTPATIENT)
Age: 30
End: 2020-01-19

## 2020-01-19 VITALS
OXYGEN SATURATION: 98 % | TEMPERATURE: 99 F | SYSTOLIC BLOOD PRESSURE: 112 MMHG | DIASTOLIC BLOOD PRESSURE: 66 MMHG | RESPIRATION RATE: 18 BRPM

## 2020-01-19 LAB
ANION GAP SERPL CALC-SCNC: 11 MMOL/L — SIGNIFICANT CHANGE UP (ref 5–17)
BASOPHILS # BLD AUTO: 0.01 K/UL — SIGNIFICANT CHANGE UP (ref 0–0.2)
BASOPHILS NFR BLD AUTO: 0.1 % — SIGNIFICANT CHANGE UP (ref 0–2)
BUN SERPL-MCNC: 11 MG/DL — SIGNIFICANT CHANGE UP (ref 7–23)
CALCIUM SERPL-MCNC: 9.2 MG/DL — SIGNIFICANT CHANGE UP (ref 8.4–10.5)
CHLORIDE SERPL-SCNC: 101 MMOL/L — SIGNIFICANT CHANGE UP (ref 96–108)
CO2 SERPL-SCNC: 25 MMOL/L — SIGNIFICANT CHANGE UP (ref 22–31)
CREAT SERPL-MCNC: 1.05 MG/DL — SIGNIFICANT CHANGE UP (ref 0.5–1.3)
EOSINOPHIL # BLD AUTO: 0 K/UL — SIGNIFICANT CHANGE UP (ref 0–0.5)
EOSINOPHIL NFR BLD AUTO: 0 % — SIGNIFICANT CHANGE UP (ref 0–6)
GLUCOSE SERPL-MCNC: 169 MG/DL — HIGH (ref 70–99)
HCT VFR BLD CALC: 41.5 % — SIGNIFICANT CHANGE UP (ref 39–50)
HGB BLD-MCNC: 13.5 G/DL — SIGNIFICANT CHANGE UP (ref 13–17)
IMM GRANULOCYTES NFR BLD AUTO: 0.5 % — SIGNIFICANT CHANGE UP (ref 0–1.5)
LYMPHOCYTES # BLD AUTO: 0.67 K/UL — LOW (ref 1–3.3)
LYMPHOCYTES # BLD AUTO: 5.3 % — LOW (ref 13–44)
MCHC RBC-ENTMCNC: 27.3 PG — SIGNIFICANT CHANGE UP (ref 27–34)
MCHC RBC-ENTMCNC: 32.5 GM/DL — SIGNIFICANT CHANGE UP (ref 32–36)
MCV RBC AUTO: 84 FL — SIGNIFICANT CHANGE UP (ref 80–100)
MONOCYTES # BLD AUTO: 0.28 K/UL — SIGNIFICANT CHANGE UP (ref 0–0.9)
MONOCYTES NFR BLD AUTO: 2.2 % — SIGNIFICANT CHANGE UP (ref 2–14)
NEUTROPHILS # BLD AUTO: 11.53 K/UL — HIGH (ref 1.8–7.4)
NEUTROPHILS NFR BLD AUTO: 91.9 % — HIGH (ref 43–77)
PLATELET # BLD AUTO: 308 K/UL — SIGNIFICANT CHANGE UP (ref 150–400)
POTASSIUM SERPL-MCNC: 4.2 MMOL/L — SIGNIFICANT CHANGE UP (ref 3.5–5.3)
POTASSIUM SERPL-SCNC: 4.2 MMOL/L — SIGNIFICANT CHANGE UP (ref 3.5–5.3)
RBC # BLD: 4.94 M/UL — SIGNIFICANT CHANGE UP (ref 4.2–5.8)
RBC # FLD: 12.1 % — SIGNIFICANT CHANGE UP (ref 10.3–14.5)
SODIUM SERPL-SCNC: 137 MMOL/L — SIGNIFICANT CHANGE UP (ref 135–145)
WBC # BLD: 12.55 K/UL — HIGH (ref 3.8–10.5)
WBC # FLD AUTO: 12.55 K/UL — HIGH (ref 3.8–10.5)

## 2020-01-19 PROCEDURE — 88311 DECALCIFY TISSUE: CPT

## 2020-01-19 PROCEDURE — 72100 X-RAY EXAM L-S SPINE 2/3 VWS: CPT

## 2020-01-19 PROCEDURE — 80048 BASIC METABOLIC PNL TOTAL CA: CPT

## 2020-01-19 PROCEDURE — C1769: CPT

## 2020-01-19 PROCEDURE — C1889: CPT

## 2020-01-19 PROCEDURE — 86850 RBC ANTIBODY SCREEN: CPT

## 2020-01-19 PROCEDURE — 86901 BLOOD TYPING SEROLOGIC RH(D): CPT

## 2020-01-19 PROCEDURE — 86900 BLOOD TYPING SEROLOGIC ABO: CPT

## 2020-01-19 PROCEDURE — 88304 TISSUE EXAM BY PATHOLOGIST: CPT

## 2020-01-19 PROCEDURE — 85027 COMPLETE CBC AUTOMATED: CPT

## 2020-01-19 PROCEDURE — 97161 PT EVAL LOW COMPLEX 20 MIN: CPT

## 2020-01-19 RX ORDER — ACETAMINOPHEN 500 MG
3 TABLET ORAL
Qty: 0 | Refills: 0 | DISCHARGE
Start: 2020-01-19

## 2020-01-19 RX ORDER — OXYCODONE HYDROCHLORIDE 5 MG/1
1 TABLET ORAL
Qty: 42 | Refills: 0
Start: 2020-01-19 | End: 2020-01-25

## 2020-01-19 RX ORDER — CYCLOBENZAPRINE HYDROCHLORIDE 10 MG/1
1 TABLET, FILM COATED ORAL
Qty: 21 | Refills: 0
Start: 2020-01-19 | End: 2020-01-25

## 2020-01-19 RX ORDER — TRAMADOL HYDROCHLORIDE 50 MG/1
1 TABLET ORAL
Qty: 28 | Refills: 0
Start: 2020-01-19 | End: 2020-01-25

## 2020-01-19 RX ORDER — SENNA PLUS 8.6 MG/1
2 TABLET ORAL
Qty: 0 | Refills: 0 | DISCHARGE
Start: 2020-01-19

## 2020-01-19 RX ORDER — DEXAMETHASONE 0.5 MG/5ML
1 ELIXIR ORAL
Qty: 24 | Refills: 0
Start: 2020-01-19 | End: 2020-01-24

## 2020-01-19 RX ADMIN — Medication 975 MILLIGRAM(S): at 02:55

## 2020-01-19 RX ADMIN — Medication 101 MILLIGRAM(S): at 03:45

## 2020-01-19 RX ADMIN — Medication 75 MILLIGRAM(S): at 05:02

## 2020-01-19 RX ADMIN — Medication 975 MILLIGRAM(S): at 03:30

## 2020-01-19 RX ADMIN — PANTOPRAZOLE SODIUM 40 MILLIGRAM(S): 20 TABLET, DELAYED RELEASE ORAL at 11:10

## 2020-01-19 RX ADMIN — CYCLOBENZAPRINE HYDROCHLORIDE 10 MILLIGRAM(S): 10 TABLET, FILM COATED ORAL at 10:16

## 2020-01-19 RX ADMIN — OXYCODONE HYDROCHLORIDE 5 MILLIGRAM(S): 5 TABLET ORAL at 13:57

## 2020-01-19 RX ADMIN — Medication 101 MILLIGRAM(S): at 10:16

## 2020-01-19 RX ADMIN — TRAMADOL HYDROCHLORIDE 50 MILLIGRAM(S): 50 TABLET ORAL at 10:45

## 2020-01-19 RX ADMIN — TRAMADOL HYDROCHLORIDE 50 MILLIGRAM(S): 50 TABLET ORAL at 10:15

## 2020-01-19 RX ADMIN — Medication 200 MILLIGRAM(S): at 04:57

## 2020-01-19 NOTE — DISCHARGE NOTE PROVIDER - NSDCFUADDINST_GEN_ALL_CORE_FT
Please keep dressing clean and intact, incision care to be followed up at Dr. Arredondo Office.    Please take your steroid taper as prescribed and instructed.

## 2020-01-19 NOTE — DISCHARGE NOTE PROVIDER - NSDCACTIVITY_GEN_ALL_CORE
Walking - Outdoors allowed/Stairs allowed/No heavy lifting/straining/Do not make important decisions/Showering allowed/Walking - Indoors allowed/Do not drive or operate machinery

## 2020-01-19 NOTE — PROGRESS NOTE ADULT - ASSESSMENT
A/P: 29y Male POD #1 s/p L3-L5 Hemilaminotomy    -Stable  -Pain management prn  -Hold all chemical DVT PPx in setting of spinal surgery  -IPCS B/L LE  -Taper  -Encourage Incentive Spirometry  -WBAT  -PT/OT  -Discharge planning to Home        ODETTE Wall  Orthopedic Surgery  [phone]

## 2020-01-19 NOTE — PROGRESS NOTE ADULT - SUBJECTIVE AND OBJECTIVE BOX
Patient resting without complaints.  No chest pain, SOB, N/V.    T(C): 36.9 (01-19-20 @ 04:38), Max: 37.1 (01-18-20 @ 22:04)  HR: 86 (01-19-20 @ 04:38) (86 - 106)  BP: 101/65 (01-19-20 @ 04:38) (101/65 - 142/91)  RR: 18 (01-19-20 @ 04:38) (16 - 18)  SpO2: 95% (01-19-20 @ 04:38) (94% - 100%)        Exam:  Gen: A&OX3, NAD, Appears comfortable, pleasant  Back: Low back dressing C/D/I, no hematoma appreciated, dull sensation grossly intact, mild incisional   tenderness   Ext:    Motor:               C5           C6            C7               C8           T1   R         5/5          5/5            5/5             5/5          5/5  L          5/5          5/5            5/5             5/5          5/5                L2             L3             L4               L5            S1  R         5/5           5/5          5/5             5/5           5/5  L          5/5          5/5           5/5             5/5           5/5    Sensory:            C5         C6         C7      C8       T1        (0=absent, 1=impaired, 2=normal, NT=not testable)  R         2            2           2        2         2  L          2            2           2        2         2               L2          L3         L4      L5       S1         (0=absent, 1=impaired, 2=normal, NT=not testable)  R         2            2            2        2        2  L          2            2           2        2         2                                  13.5   8.11  )-----------( 246      ( 18 Jan 2020 16:39 )             41.0    01-19    137  |  101  |  11  ----------------------------<  169<H>  4.2   |  25  |  1.05    Ca    9.2      19 Jan 2020 07:17

## 2020-01-19 NOTE — PHYSICAL THERAPY INITIAL EVALUATION ADULT - PERTINENT HX OF CURRENT PROBLEM, REHAB EVAL
Pt is a 28 y/o male who presents POD#1 L3-5 right hemilaminotomy Pt is a 28 y/o male who presents POD#1 L3-5 right hemilaminotomy.

## 2020-01-19 NOTE — DISCHARGE NOTE PROVIDER - HOSPITAL COURSE
Patient is a 30 y/o M with Hx of Seizures, CVA (no deficits) and Pseudotumor cerebri; Admitted to [facility] on 1/18/2020 for a L3-L5 aníbal/laminectomy with Dr. azar.  patient tolerated procedure well with no complications.  Patient evaluated by Physical Therapy who cleared patient for home disposition with outpatient PT.  Remainder of hospital stay unremarkable.        I-Stop: This report was requested by: Michael Wang | Reference #: [id number]

## 2020-01-19 NOTE — PHYSICAL THERAPY INITIAL EVALUATION ADULT - ADDITIONAL COMMENTS
Pt lives with family in a home with 1 flight of steps to negotiate. PTA pt was (I) with ADLs and functional mobility.

## 2020-01-19 NOTE — DISCHARGE NOTE PROVIDER - CARE PROVIDER_API CALL
Brennon Arredondo)  Orthopedics  [address]  [place] [address]  Phone: [phone]  Fax: [phone]  Follow Up Time:

## 2020-01-19 NOTE — DISCHARGE NOTE NURSING/CASE MANAGEMENT/SOCIAL WORK - PATIENT PORTAL LINK FT
You can access the FollowMyHealth Patient Portal offered by [facility] by registering at the following website: [email or web address]. By joining [employer]’s FollowMyHealth portal, you will also be able to view your health information using other applications (apps) compatible with our system.

## 2020-01-19 NOTE — DISCHARGE NOTE PROVIDER - NSDCCPCAREPLAN_GEN_ALL_CORE_FT
PRINCIPAL DISCHARGE DIAGNOSIS  Diagnosis: Disorder of lumbar spine  Assessment and Plan of Treatment:

## 2020-01-19 NOTE — DISCHARGE NOTE PROVIDER - NSDCMRMEDTOKEN_GEN_ALL_CORE_FT
acetaminophen 325 mg oral tablet: 3 tab(s) orally every 8 hours, As Needed for mild pain.  cyclobenzaprine 10 mg oral tablet: 1 tab(s) orally 3 times a day, As needed, Muscle Spasm  Lyrica 75 mg oral capsule: 1 cap(s) orally 2 times a day  oxyCODONE 5 mg oral tablet: 1 tab(s) orally every 4 hours, As needed, Severe Pain (4 - 6) MDD:6  senna oral tablet: 2 tab(s) orally once a day (at bedtime)  traMADol 50 mg oral tablet: 1 tab(s) orally every 6 hours, As needed, Moderate Pain (1 - 3) MDD:4

## 2020-01-21 PROBLEM — M53.86 OTHER SPECIFIED DORSOPATHIES, LUMBAR REGION: Chronic | Status: ACTIVE | Noted: 2020-01-06

## 2020-01-21 PROBLEM — Z86.73 PERSONAL HISTORY OF TRANSIENT ISCHEMIC ATTACK (TIA), AND CEREBRAL INFARCTION WITHOUT RESIDUAL DEFICITS: Chronic | Status: ACTIVE | Noted: 2020-01-06

## 2020-01-21 PROBLEM — Z87.898 PERSONAL HISTORY OF OTHER SPECIFIED CONDITIONS: Chronic | Status: ACTIVE | Noted: 2020-01-06

## 2020-01-21 PROBLEM — E66.01 MORBID (SEVERE) OBESITY DUE TO EXCESS CALORIES: Chronic | Status: ACTIVE | Noted: 2020-01-06

## 2020-01-21 PROBLEM — M25.572 PAIN IN LEFT ANKLE AND JOINTS OF LEFT FOOT: Chronic | Status: ACTIVE | Noted: 2020-01-06

## 2020-01-21 PROBLEM — G93.2 BENIGN INTRACRANIAL HYPERTENSION: Chronic | Status: ACTIVE | Noted: 2020-01-06

## 2020-01-21 PROBLEM — M48.061 SPINAL STENOSIS, LUMBAR REGION WITHOUT NEUROGENIC CLAUDICATION: Chronic | Status: ACTIVE | Noted: 2020-01-06

## 2020-01-28 ENCOUNTER — APPOINTMENT (OUTPATIENT)
Dept: ORTHOPEDIC SURGERY | Facility: CLINIC | Age: 30
End: 2020-01-28
Payer: MEDICAID

## 2020-01-28 PROCEDURE — 99024 POSTOP FOLLOW-UP VISIT: CPT

## 2020-01-30 LAB — SURGICAL PATHOLOGY STUDY: SIGNIFICANT CHANGE UP

## 2020-03-18 ENCOUNTER — APPOINTMENT (OUTPATIENT)
Dept: ORTHOPEDIC SURGERY | Facility: CLINIC | Age: 30
End: 2020-03-18

## 2020-04-13 ENCOUNTER — RX RENEWAL (OUTPATIENT)
Age: 30
End: 2020-04-13

## 2020-04-13 RX ORDER — MELOXICAM 15 MG/1
15 TABLET ORAL
Qty: 30 | Refills: 1 | Status: ACTIVE | COMMUNITY
Start: 2020-01-28 | End: 1900-01-01

## 2020-04-29 ENCOUNTER — RX RENEWAL (OUTPATIENT)
Age: 30
End: 2020-04-29

## 2020-04-29 RX ORDER — CYCLOBENZAPRINE HYDROCHLORIDE 5 MG/1
5 TABLET, FILM COATED ORAL 3 TIMES DAILY
Qty: 90 | Refills: 1 | Status: ACTIVE | COMMUNITY
Start: 2020-01-28 | End: 1900-01-01

## 2020-07-27 ENCOUNTER — APPOINTMENT (OUTPATIENT)
Dept: ORTHOPEDIC SURGERY | Facility: CLINIC | Age: 30
End: 2020-07-27
Payer: MEDICAID

## 2020-07-27 DIAGNOSIS — M76.50 PATELLAR TENDINITIS, UNSPECIFIED KNEE: ICD-10-CM

## 2020-07-27 PROCEDURE — 99214 OFFICE O/P EST MOD 30 MIN: CPT

## 2020-07-27 RX ORDER — MELOXICAM 15 MG/1
15 TABLET ORAL
Qty: 30 | Refills: 1 | Status: ACTIVE | COMMUNITY
Start: 2020-07-27 | End: 1900-01-01

## 2020-07-31 ENCOUNTER — APPOINTMENT (OUTPATIENT)
Dept: ORTHOPEDIC SURGERY | Facility: CLINIC | Age: 30
End: 2020-07-31
Payer: MEDICAID

## 2020-07-31 VITALS
DIASTOLIC BLOOD PRESSURE: 92 MMHG | SYSTOLIC BLOOD PRESSURE: 131 MMHG | BODY MASS INDEX: 45.1 KG/M2 | HEIGHT: 70 IN | WEIGHT: 315 LBS | HEART RATE: 80 BPM

## 2020-07-31 DIAGNOSIS — Z00.00 ENCOUNTER FOR GENERAL ADULT MEDICAL EXAMINATION W/OUT ABNORMAL FINDINGS: ICD-10-CM

## 2020-07-31 PROCEDURE — 99214 OFFICE O/P EST MOD 30 MIN: CPT

## 2020-07-31 PROCEDURE — 73564 X-RAY EXAM KNEE 4 OR MORE: CPT | Mod: RT

## 2020-07-31 NOTE — HISTORY OF PRESENT ILLNESS
[de-identified] : 30-year-old male morbidly obese chief complaint of right knee pain he reports pain for several months. His recent spine surgery with Dr. Arredondo. He complains of stiffness in his knee and difficulty with bending. He denies numbness and tingling\par \par The patient's past medical history, past surgical history, medications, allergies, and social history were reviewed by me today with the patient and documented accordingly. In addition, the patient's family history, which is noncontributory to this visit, was also reviewed.\par

## 2020-07-31 NOTE — DISCUSSION/SUMMARY
[de-identified] : 30-year-old male morbid obesity with knee pain recommend weight loss consider bariatric referral. Physical therapy. Followup as needed

## 2020-07-31 NOTE — PHYSICAL EXAM
[de-identified] : General Exam\par \par Well developed, well nourished\par No apparent distress\par Oriented to person, place, and time\par Mood: Normal\par Affect: Normal\par Balance and coordination: Normal\par Gait: Normal\par \par right knee exam\par \par Skin: Clean, dry, intact\par Inspection: No obvious malalignment, no masses, no swelling, no effusion.\par Tenderness: no MJLT. + LJLT. No tenderness over the medial and lateral patella facets. No ttp medial/lateral epicondyle, patella tendon, tibial tubercle, pes anserinus, or proximal fibula.\par ROM: 0 to 120° no pain with deep flexion in both knees\par Stability: Stable to varus, valgus, lachman testing. Negative anterior/posterior drawer.\par Additional tests: Negative McMurrays test, Negative patellar grind test. \par Strength: 5/5 Q/H/TA/GS/EHL, no atrophy\par Neuro: In tact to light touch throughout in dp/sp/tib/anitra/saph nerve districutions, DTR's normal\par Pulses: 2+ DP/PT pulses.\par  [de-identified] : \par The following radiographs were ordered and read by me during this patients visit. I reviewed each radiograph in detail with the patient and discussed the findings as highlighted below. \par \par 4 views of her knee are obtained today. There is mild degenerative changes joint space narrowing\par \par

## 2020-11-20 ENCOUNTER — APPOINTMENT (OUTPATIENT)
Dept: ORTHOPEDIC SURGERY | Facility: CLINIC | Age: 30
End: 2020-11-20

## 2020-11-29 ENCOUNTER — APPOINTMENT (OUTPATIENT)
Dept: MRI IMAGING | Facility: CLINIC | Age: 30
End: 2020-11-29

## 2020-11-29 ENCOUNTER — OUTPATIENT (OUTPATIENT)
Dept: OUTPATIENT SERVICES | Facility: HOSPITAL | Age: 30
LOS: 1 days | End: 2020-11-29
Payer: MEDICAID

## 2020-11-29 DIAGNOSIS — M25.561 PAIN IN RIGHT KNEE: ICD-10-CM

## 2020-11-29 DIAGNOSIS — Z87.438 PERSONAL HISTORY OF OTHER DISEASES OF MALE GENITAL ORGANS: Chronic | ICD-10-CM

## 2020-11-29 DIAGNOSIS — Z90.89 ACQUIRED ABSENCE OF OTHER ORGANS: Chronic | ICD-10-CM

## 2020-11-29 PROCEDURE — 73721 MRI JNT OF LWR EXTRE W/O DYE: CPT | Mod: 26,RT

## 2021-04-16 ENCOUNTER — APPOINTMENT (OUTPATIENT)
Dept: ORTHOPEDIC SURGERY | Facility: CLINIC | Age: 31
End: 2021-04-16

## 2021-07-21 ENCOUNTER — APPOINTMENT (OUTPATIENT)
Dept: ORTHOPEDIC SURGERY | Facility: CLINIC | Age: 31
End: 2021-07-21
Payer: MEDICAID

## 2021-07-21 DIAGNOSIS — G89.29 PAIN IN RIGHT KNEE: ICD-10-CM

## 2021-07-21 DIAGNOSIS — M25.561 PAIN IN RIGHT KNEE: ICD-10-CM

## 2021-07-21 PROCEDURE — 20610 DRAIN/INJ JOINT/BURSA W/O US: CPT | Mod: RT

## 2021-07-21 PROCEDURE — 99213 OFFICE O/P EST LOW 20 MIN: CPT | Mod: 25

## 2021-07-21 NOTE — HISTORY OF PRESENT ILLNESS
[de-identified] : 31-year-old male morbidly obese chief complaint of right knee pain he reports pain for several months. His recent spine surgery with Dr. Arredondo. He complains of stiffness in his knee and difficulty with bending. He denies numbness and tingling He has not done PT as recommended

## 2021-07-21 NOTE — DISCUSSION/SUMMARY
[de-identified] : Right knee patellofemoral pain. Again recommend physical therapy we discussed the importance of physical therapy for the again encouraged weight loss.\par \par Injection: Right knee joint.\par Indication: Patellofemoral pain\par \par A discussion was had with the patient regarding this procedure and all questions were answered. All risks, benefits and alternatives were discussed. These included but were not limited to bleeding, infection, and allergic reaction. Alcohol was used to clean the skin, and betadine was used to sterilize and prep the area in the supero-lateral aspect of the right knee. Ethyl chloride spray was then used as a topical anesthetic. A 21-gauge needle was used to inject 4cc of 1% lidocaine and 1cc of 40mg/ml methylprednisolone into the knee. A sterile bandage was then applied. The patient tolerated the procedure well and there were no complications. \par \par All questions answered follow up as needed

## 2021-07-21 NOTE — PHYSICAL EXAM
[de-identified] : right knee exam\par \par Skin: Clean, dry, intact\par Inspection: No obvious malalignment, no masses, no swelling, no effusion.\par Tenderness: no MJLT. + LJLT. No tenderness over the medial and lateral patella facets. No ttp medial/lateral epicondyle, patella tendon, tibial tubercle, pes anserinus, or proximal fibula.\par ROM: 0 to 120° no pain with deep flexion in both knees\par Stability: Stable to varus, valgus, lachman testing. Negative anterior/posterior drawer.\par Additional tests: Negative McMurrays test, Negative patellar grind test. \par Strength: 5/5 Q/H/TA/GS/EHL, no atrophy\par Neuro: In tact to light touch throughout in dp/sp/tib/anitra/saph nerve districutions, DTR's normal\par Pulses: 2+ DP/PT pulses. [de-identified] : MRI again reviewed. Mild patellofemoral chondrosis

## 2022-08-22 ENCOUNTER — APPOINTMENT (OUTPATIENT)
Dept: ORTHOPEDIC SURGERY | Facility: CLINIC | Age: 32
End: 2022-08-22

## 2022-08-22 ENCOUNTER — NON-APPOINTMENT (OUTPATIENT)
Age: 32
End: 2022-08-22

## 2022-08-22 VITALS
BODY MASS INDEX: 45.1 KG/M2 | SYSTOLIC BLOOD PRESSURE: 140 MMHG | HEIGHT: 70 IN | DIASTOLIC BLOOD PRESSURE: 82 MMHG | WEIGHT: 315 LBS

## 2022-08-22 DIAGNOSIS — M51.36 OTHER INTERVERTEBRAL DISC DEGENERATION, LUMBAR REGION: ICD-10-CM

## 2022-08-22 PROCEDURE — 99214 OFFICE O/P EST MOD 30 MIN: CPT

## 2022-08-22 RX ORDER — TIZANIDINE 2 MG/1
2 TABLET ORAL EVERY 6 HOURS
Qty: 56 | Refills: 0 | Status: ACTIVE | COMMUNITY
Start: 2022-08-22 | End: 1900-01-01

## 2022-08-22 NOTE — PHYSICAL EXAM
[de-identified] : Lumbar Physical Exam\par \par Gait - Normal\par \par Station - Normal\par \par Sagittal balance - Normal\par \par Compensatory mechanism? - None\par \par Heel walk - Normal\par \par Toe walk - Normal\par \par Reflexes\par Patellar - normal\par Gastroc - normal\par Clonus - No\par \par Hip Exam - Normal\par \par Straight leg raise - none\par \par Pulses - 2+ dp/pt\par \par Range of motion - normal\par \par Sensation \par Sensation intact to light touch in L1, L2, L3, L4, L5 and S1 dermatomes bilaterally\par \par Motor\par 	IP	Quad	HS	TA	Gastroc	EHL\par Right	5/5	5/5	5/5	5/5	5/5	5/5\par Left	5/5	5/5	5/5	5/5	5/5	5/5 [de-identified] : Lumbar radiographs\par Degeneration noted\par No instability on flexion-extension radiographs

## 2022-08-22 NOTE — HISTORY OF PRESENT ILLNESS
[de-identified] : José Miguel Olivas is a 32-year-old male that is here today for evaluation treatment of his low back pain.  He denies any severe radicular type pain.  Pain which does radiate over his left lateral thigh.  He denies any bowel bladder issues.  Denies any saddle anesthesia.  He did have a previous surgery in 2019 for right leg symptoms.  Thankfully his symptoms did improve after the surgery.  Only over the past 2 weeks has developed some fairly significant back and left lower extremity symptoms.

## 2022-08-30 PROBLEM — Z00.00 ENCOUNTER FOR PREVENTIVE HEALTH EXAMINATION: Noted: 2022-08-30

## 2022-09-14 ENCOUNTER — APPOINTMENT (OUTPATIENT)
Dept: ORTHOPEDIC SURGERY | Facility: CLINIC | Age: 32
End: 2022-09-14

## 2022-09-19 ENCOUNTER — APPOINTMENT (OUTPATIENT)
Dept: ORTHOPEDIC SURGERY | Facility: CLINIC | Age: 32
End: 2022-09-19

## 2022-10-10 ENCOUNTER — APPOINTMENT (OUTPATIENT)
Dept: ORTHOPEDIC SURGERY | Facility: CLINIC | Age: 32
End: 2022-10-10

## 2022-10-10 DIAGNOSIS — M17.11 UNILATERAL PRIMARY OSTEOARTHRITIS, RIGHT KNEE: ICD-10-CM

## 2022-10-10 PROCEDURE — 73564 X-RAY EXAM KNEE 4 OR MORE: CPT | Mod: RT

## 2022-10-10 PROCEDURE — 99214 OFFICE O/P EST MOD 30 MIN: CPT

## 2022-10-10 RX ORDER — HYALURONATE SODIUM, STABILIZED 88 MG/4 ML
88 SYRINGE (ML) INTRAARTICULAR
Qty: 1 | Refills: 0 | Status: ACTIVE | COMMUNITY
Start: 2022-10-10

## 2022-10-10 RX ORDER — DICLOFENAC SODIUM 50 MG/1
50 TABLET, DELAYED RELEASE ORAL
Qty: 28 | Refills: 0 | Status: ACTIVE | COMMUNITY
Start: 2022-10-10 | End: 1900-01-01

## 2023-05-23 NOTE — PRE-ANESTHESIA EVALUATION ADULT - NSANTHSNORERD_ENT_A_CORE
Physical Therapy  PT orders received.  Chart reviewed  Patient not seen in therapy.     On hold due to physician request and medical condition    Re-attempt plan: later today or tomorrow    Spoke with hospitalist.  Plan to hold therapies pending evaluation by neurosurgery due to intractable low back pain.       OBJECTIVE                         Therapy procedure time and total treatment time can be found documented on the Time Entry flowsheet   No

## 2023-10-01 PROBLEM — M54.16 LUMBAR RADICULAR PAIN: Status: ACTIVE | Noted: 2019-07-16

## 2023-10-24 NOTE — PATIENT PROFILE ADULT - NSPROGENANESREACTION_GEN_A_NUR
no previous reaction Body Location Override (Optional - Billing Will Still Be Based On Selected Body Map Location If Applicable): right calf Proximal

## 2024-04-12 NOTE — PHYSICAL THERAPY INITIAL EVALUATION ADULT - GENERAL OBSERVATIONS, REHAB EVAL
Thankyou for the opportunity to care of you today.  Please take all medications as directed, continue any previous prescribed medications unless we specifically discussed holding them.  If your symptoms do not resolve or worsen please return to the clinic for re-evaluation, if your situation becomes emergent please present to to the nearest emergency department.  Follow-up with your PCP for continued evaluation and management.    Present for ultrasound as discussed.  Follow up with primary.  
Pt received supine in bed, +IV lock

## 2024-05-01 NOTE — DISCHARGE NOTE PROVIDER - CARE PROVIDERS DIRECT ADDRESSES
Name: Olivia Addison    YOB: 1948    DATE OF SURGERY: 5/1/2024    PREOPERATIVE DIAGNOSIS: Left knee end-stage osteoarthritis    POSTOPERATIVE DIAGNOSIS: Left knee end-stage osteoarthritis    PROCEDURE PERFORMED: Left total knee replacement     SURGEON: Greyson Mccrary M.D.    ASSISTANT: DANNY RAMIRES    A surgical assistant was integral in ensuring a successful outcome with this procedure.  The assistant was utilized to assist in positioning the patient, draping the patient, was used throughout the case to provide with retraction of tissues, suctioning of blood and body fluids for visualization, positioning of the extremity to allow for proper exposure so that I could perform the procedure.  Without the use of a surgical assistant during this procedure I feel that the outcome may have been compromised or would have been suboptimal or at risk for complications.    IMPLANTS: [employer] Legion:     Implant Name Type Inv. Item Serial No.  Lot No. LRB No. Used Action   CMT BONE PALACOS R HI/VISC 1X40 - [id number] Implant CMT BONE PALACOS R HI/VISC 1X40  [place] [id number] Left 1 Implanted   DEV CONTRL TISS STRATAFIX SYMM PDS PLUS ALPHONSE CT-1 60CM - [id number] Implant DEV CONTRL TISS STRATAFIX SYMM PDS PLUS ALPHONSE CT-1 60CM  [employer]  DIV [employer] J [employer] J [id number] Left 1 Implanted   DEV CONTRL TISS STRATAFIXSPIRALMNCRYL PLSPS2 REV3/0 45CM - [id number] Implant DEV CONTRL TISS STRATAFIXSPIRALMNCRYL PLSPS2 REV3/0 45CM  [employer]  DIV OF J AND J [id number] Left 1 Implanted   CMT BONE PALACOS R HI/VISC 1X40 - [id number] Implant CMT BONE PALACOS R HI/VISC 1X40  [employer] [id number] Left 1 Implanted   COMP FEM LEGION OXINIUM CR SZ4 LT - [id number] Implant COMP FEM LEGION OXINIUM CR SZ4 LT  [employer] [id number] Left 1 Implanted   BASE TIB/KN GEN2 NONPOR TI SZ3 LT - [id number] Implant BASE TIB/KN GEN2 NONPOR TI SZ3 LT  [place] [employer] [id number] Left 1 Implanted   PAT GEN2 BICONVEX [id number] - [id number]  Implant PAT GEN2 BICONVEX [id number]  [employer] [id number] Left 1 Implanted   INSRT ART/KN LEGION CR DEEP DISH XLPE SZ3TO4 11MM - [id number] Implant INSRT ART/KN LEGION CR DEEP DISH XLPE SZ3TO4 11MM  [employer] [id number] Left 1 Implanted       Estimated Blood Loss: 200cc  Specimens : none  Complications: none    DESCRIPTION OF PROCEDURE: The patient was taken to the operating room and placed in the supine position. A sequential compression device was carefully placed on the non-operative leg. Preoperative antibiotics were administered. Surgical time out was performed. After adequate induction of anesthesia, the leg was prepped and draped in the usual sterile fashion, exsanguinated with an Esmarch bandage and the tourniquet inflated to 250 mmHg. A midline incision was performed followed by a medial parapatellar arthrotomy. The patella was subluxed laterally.  A portion of the fat pad, ACL, and anterior horns of the meniscus were excised.  A drill hole was then placed in the center of the femoral canal in line with the canal.  It was irrigated and suctioned.  The intramedullary earlene was then placed and a 5 degree distal valgus cut was performed after the block pinned in place appropriately.  Cut surface was then removed.  The sizing and rotation guide was then placed and seated appropriately.  It was sized and then the drill holes for the 4-in-1 cutting guide were placed in 3 degrees of external rotation based off of the posterior condyles.  The 4-in-1 cutting block was then placed and the femoral cuts were performed.  The excess bone was removed and the cut surfaces looked good.  At this point we placed the retractors around the proximal tibia and a slight release of the PCL fibers off of the posterior proximal tibia was performed.  We used the extramedullary tibial alignment guide and it was aligned appropriately and then the depth was set and the block pinned in place.  The tibial cut was then performed and  the alignment guide was removed.  The tibial cut was removed and the cut surface looked good.  The posterior horns of the menisci were then removed as well as the posterior osteophytes.  Flexion extension blocks were then used to check the balance of the knee. The tibial cut surface was then sized with the sizing templates and the tibial and femoral trial were then placed. The knee was placed in full extension and then the tibial tray rotation was then matched to the femoral rotation and marked.    Attention was then placed to the patella. The patella was noted to track centrally through range of motion. The patella was then sized with the trials. The thickness of the patella was then measured. The patella was resurfaced and the surrounding osteophytes were removed. The preoperative thickness was reproduced. The patella tracked centrally through range of motion.  We then checked the balance with the trial implants in place and there was excellent medial lateral and flexion-extension balance.    At this point all trial components were removed, the knee was copiously irrigated with pulsed lavage, and the knee was injected with anesthetic cocktail solution. The cut surfaces were then dried with clean lap sponges, and the components were cemented tibia, followed by femur, then patella. The knee was held in full extension and all excess cement was removed. The knee was held still until the cement had completely hardened. We then placed the trial polyethylene spacer which resulted in full extension and excellent flexion-extension balance. We placed the final polyethylene spacer.   The knee was then copiously irrigated. The tourniquet was then released. There was excellent hemostasis. We placed a one-eighth inch Hemovac drain. We closed the knee in multiple layers in standard fashion. Sterile dressing were applied. At the end of the case, the sponge and needle counts were reported as being correct. There were no known  complications. The patient was then transported to the recovery room.      Greyson Mccrary M.D.     [email or web address]

## 2024-08-18 ENCOUNTER — TRANSCRIPTION ENCOUNTER (OUTPATIENT)
Age: 34
End: 2024-08-18

## 2024-08-18 ENCOUNTER — INPATIENT (INPATIENT)
Facility: HOSPITAL | Age: 34
LOS: 1 days | Discharge: HOME | DRG: 418 | End: 2024-08-20
Attending: STUDENT IN AN ORGANIZED HEALTH CARE EDUCATION/TRAINING PROGRAM | Admitting: STUDENT IN AN ORGANIZED HEALTH CARE EDUCATION/TRAINING PROGRAM
Payer: MEDICAID

## 2024-08-18 VITALS
TEMPERATURE: 98 F | DIASTOLIC BLOOD PRESSURE: 76 MMHG | OXYGEN SATURATION: 98 % | WEIGHT: 315 LBS | RESPIRATION RATE: 17 BRPM | SYSTOLIC BLOOD PRESSURE: 115 MMHG | HEART RATE: 81 BPM

## 2024-08-18 DIAGNOSIS — R10.9 UNSPECIFIED ABDOMINAL PAIN: ICD-10-CM

## 2024-08-18 DIAGNOSIS — Z87.438 PERSONAL HISTORY OF OTHER DISEASES OF MALE GENITAL ORGANS: Chronic | ICD-10-CM

## 2024-08-18 DIAGNOSIS — Z90.89 ACQUIRED ABSENCE OF OTHER ORGANS: Chronic | ICD-10-CM

## 2024-08-18 LAB
ALBUMIN SERPL ELPH-MCNC: 3.6 G/DL — SIGNIFICANT CHANGE UP (ref 3.5–5)
ALP SERPL-CCNC: 96 U/L — SIGNIFICANT CHANGE UP (ref 40–120)
ALT FLD-CCNC: 59 U/L DA — SIGNIFICANT CHANGE UP (ref 10–60)
ANION GAP SERPL CALC-SCNC: 8 MMOL/L — SIGNIFICANT CHANGE UP (ref 5–17)
APPEARANCE UR: CLEAR — SIGNIFICANT CHANGE UP
APTT BLD: 29.7 SEC — SIGNIFICANT CHANGE UP (ref 24.5–35.6)
AST SERPL-CCNC: 71 U/L — HIGH (ref 10–40)
BASOPHILS # BLD AUTO: 0.03 K/UL — SIGNIFICANT CHANGE UP (ref 0–0.2)
BASOPHILS NFR BLD AUTO: 0.3 % — SIGNIFICANT CHANGE UP (ref 0–2)
BILIRUB SERPL-MCNC: 0.8 MG/DL — SIGNIFICANT CHANGE UP (ref 0.2–1.2)
BILIRUB UR-MCNC: NEGATIVE — SIGNIFICANT CHANGE UP
BLD GP AB SCN SERPL QL: SIGNIFICANT CHANGE UP
BUN SERPL-MCNC: 19 MG/DL — HIGH (ref 7–18)
CALCIUM SERPL-MCNC: 9.4 MG/DL — SIGNIFICANT CHANGE UP (ref 8.4–10.5)
CHLORIDE SERPL-SCNC: 105 MMOL/L — SIGNIFICANT CHANGE UP (ref 96–108)
CO2 SERPL-SCNC: 27 MMOL/L — SIGNIFICANT CHANGE UP (ref 22–31)
COLOR SPEC: SIGNIFICANT CHANGE UP
CREAT SERPL-MCNC: 1.2 MG/DL — SIGNIFICANT CHANGE UP (ref 0.5–1.3)
DIFF PNL FLD: NEGATIVE — SIGNIFICANT CHANGE UP
EGFR: 80 ML/MIN/1.73M2 — SIGNIFICANT CHANGE UP
EOSINOPHIL # BLD AUTO: 0.01 K/UL — SIGNIFICANT CHANGE UP (ref 0–0.5)
EOSINOPHIL NFR BLD AUTO: 0.1 % — SIGNIFICANT CHANGE UP (ref 0–6)
GLUCOSE SERPL-MCNC: 127 MG/DL — HIGH (ref 70–99)
GLUCOSE UR QL: NEGATIVE MG/DL — SIGNIFICANT CHANGE UP
HCT VFR BLD CALC: 42.7 % — SIGNIFICANT CHANGE UP (ref 39–50)
HGB BLD-MCNC: 14.1 G/DL — SIGNIFICANT CHANGE UP (ref 13–17)
IMM GRANULOCYTES NFR BLD AUTO: 0.3 % — SIGNIFICANT CHANGE UP (ref 0–0.9)
INR BLD: 1.04 RATIO — SIGNIFICANT CHANGE UP (ref 0.85–1.18)
KETONES UR-MCNC: ABNORMAL MG/DL
LEUKOCYTE ESTERASE UR-ACNC: NEGATIVE — SIGNIFICANT CHANGE UP
LIDOCAIN IGE QN: 19 U/L — SIGNIFICANT CHANGE UP (ref 13–75)
LYMPHOCYTES # BLD AUTO: 0.84 K/UL — LOW (ref 1–3.3)
LYMPHOCYTES # BLD AUTO: 7.8 % — LOW (ref 13–44)
MCHC RBC-ENTMCNC: 27 PG — SIGNIFICANT CHANGE UP (ref 27–34)
MCHC RBC-ENTMCNC: 33 GM/DL — SIGNIFICANT CHANGE UP (ref 32–36)
MCV RBC AUTO: 81.8 FL — SIGNIFICANT CHANGE UP (ref 80–100)
MONOCYTES # BLD AUTO: 0.46 K/UL — SIGNIFICANT CHANGE UP (ref 0–0.9)
MONOCYTES NFR BLD AUTO: 4.3 % — SIGNIFICANT CHANGE UP (ref 2–14)
NEUTROPHILS # BLD AUTO: 9.43 K/UL — HIGH (ref 1.8–7.4)
NEUTROPHILS NFR BLD AUTO: 87.2 % — HIGH (ref 43–77)
NITRITE UR-MCNC: NEGATIVE — SIGNIFICANT CHANGE UP
NRBC # BLD: 0 /100 WBCS — SIGNIFICANT CHANGE UP (ref 0–0)
PH UR: 5 — SIGNIFICANT CHANGE UP (ref 5–8)
PLATELET # BLD AUTO: 260 K/UL — SIGNIFICANT CHANGE UP (ref 150–400)
POTASSIUM SERPL-MCNC: 4.4 MMOL/L — SIGNIFICANT CHANGE UP (ref 3.5–5.3)
POTASSIUM SERPL-SCNC: 4.4 MMOL/L — SIGNIFICANT CHANGE UP (ref 3.5–5.3)
PROT SERPL-MCNC: 7.4 G/DL — SIGNIFICANT CHANGE UP (ref 6–8.3)
PROT UR-MCNC: NEGATIVE MG/DL — SIGNIFICANT CHANGE UP
PROTHROM AB SERPL-ACNC: 11.8 SEC — SIGNIFICANT CHANGE UP (ref 9.5–13)
RBC # BLD: 5.22 M/UL — SIGNIFICANT CHANGE UP (ref 4.2–5.8)
RBC # FLD: 12.1 % — SIGNIFICANT CHANGE UP (ref 10.3–14.5)
SODIUM SERPL-SCNC: 140 MMOL/L — SIGNIFICANT CHANGE UP (ref 135–145)
SP GR SPEC: 1.03 — SIGNIFICANT CHANGE UP (ref 1–1.03)
UROBILINOGEN FLD QL: 1 MG/DL — SIGNIFICANT CHANGE UP (ref 0.2–1)
WBC # BLD: 10.8 K/UL — HIGH (ref 3.8–10.5)
WBC # FLD AUTO: 10.8 K/UL — HIGH (ref 3.8–10.5)

## 2024-08-18 PROCEDURE — 99222 1ST HOSP IP/OBS MODERATE 55: CPT | Mod: 57

## 2024-08-18 PROCEDURE — 99285 EMERGENCY DEPT VISIT HI MDM: CPT

## 2024-08-18 PROCEDURE — 93010 ELECTROCARDIOGRAM REPORT: CPT

## 2024-08-18 PROCEDURE — 76705 ECHO EXAM OF ABDOMEN: CPT | Mod: 26

## 2024-08-18 RX ORDER — CEFOTETAN 2 G/1
1 INJECTION, POWDER, FOR SOLUTION INTRAMUSCULAR; INTRAVENOUS ONCE
Refills: 0 | Status: COMPLETED | OUTPATIENT
Start: 2024-08-18 | End: 2024-08-18

## 2024-08-18 RX ORDER — SODIUM CHLORIDE 9 MG/ML
1000 INJECTION INTRAMUSCULAR; INTRAVENOUS; SUBCUTANEOUS ONCE
Refills: 0 | Status: COMPLETED | OUTPATIENT
Start: 2024-08-18 | End: 2024-08-18

## 2024-08-18 RX ORDER — ONDANSETRON 2 MG/ML
4 INJECTION, SOLUTION INTRAMUSCULAR; INTRAVENOUS EVERY 6 HOURS
Refills: 0 | Status: DISCONTINUED | OUTPATIENT
Start: 2024-08-18 | End: 2024-08-19

## 2024-08-18 RX ORDER — ONDANSETRON 2 MG/ML
4 INJECTION, SOLUTION INTRAMUSCULAR; INTRAVENOUS ONCE
Refills: 0 | Status: COMPLETED | OUTPATIENT
Start: 2024-08-18 | End: 2024-08-18

## 2024-08-18 RX ORDER — METRONIDAZOLE 250 MG
500 TABLET ORAL ONCE
Refills: 0 | Status: COMPLETED | OUTPATIENT
Start: 2024-08-18 | End: 2024-08-18

## 2024-08-18 RX ADMIN — ONDANSETRON 4 MILLIGRAM(S): 2 INJECTION, SOLUTION INTRAMUSCULAR; INTRAVENOUS at 10:58

## 2024-08-18 RX ADMIN — Medication 100 MILLIGRAM(S): at 16:37

## 2024-08-18 RX ADMIN — SODIUM CHLORIDE 1000 MILLILITER(S): 9 INJECTION INTRAMUSCULAR; INTRAVENOUS; SUBCUTANEOUS at 18:39

## 2024-08-18 RX ADMIN — Medication 175 MILLILITER(S): at 19:49

## 2024-08-18 RX ADMIN — CEFOTETAN 100 GRAM(S): 2 INJECTION, POWDER, FOR SOLUTION INTRAMUSCULAR; INTRAVENOUS at 16:37

## 2024-08-18 RX ADMIN — Medication 175 MILLILITER(S): at 16:59

## 2024-08-18 RX ADMIN — Medication 4 MILLIGRAM(S): at 11:42

## 2024-08-18 RX ADMIN — Medication 4 MILLIGRAM(S): at 10:57

## 2024-08-18 RX ADMIN — SODIUM CHLORIDE 1000 MILLILITER(S): 9 INJECTION INTRAMUSCULAR; INTRAVENOUS; SUBCUTANEOUS at 10:58

## 2024-08-18 NOTE — H&P ADULT - HISTORY OF PRESENT ILLNESS
34M with no sig pmhx coming to [facility] with 1 day of epigastric/RUQ pain associated with right flank pain, nausea, and vomiting. Pt states he woke up at 2am this morning with right sided flank pain which developed into epigastric/RUQ "fullness" and discomfort. He subsequently developed nausea and had a few episodes of NBNB emesis. Pt denies fever or chills as home. He states he had a similar episode of pain about a month ago with RUQ sharp pains which lasted about a day and he did not seek medical care for. The patient states he had 2 slices of pizza for dinner last night. Pt has extensive family history of cholecystitis with mother, sister, and multiple grandparents having cholecystectomy. Pt denies any other complaints at this time. Pt denies any abdominal surgery in the past.

## 2024-08-18 NOTE — H&P ADULT - ASSESSMENT
34M with acute cholecystitis  wct 10.6, afebrile    Plan:  -admit to surgery team  -add on for lap citlaly planning tomorrow  -preop labs  -npo/ivf  -iv abx  -pain control/nausea control prn  -Discussed with Dr. Rivera

## 2024-08-18 NOTE — ED PROVIDER NOTE - CLINICAL SUMMARY MEDICAL DECISION MAKING FREE TEXT BOX
34-year-old male coming in with pain in the right flank radiating from epigastric region.  Started couple days ago.  Feeling nauseous, no vomiting.  No known history of kidney stones.  No fevers, chills.  Patient is well-appearing.  No distress.  Abdomen soft with tenderness to palpation in epigastric and right upper quadrant.  Right flank tenderness to palpation.  Differential diagnoses include but not limited to gallbladder pathology, pancreatitis, pyelonephritis.

## 2024-08-18 NOTE — ED ADULT NURSE NOTE - OBJECTIVE STATEMENT
pt is here for abdominal pain.  pt stated that diffuse abdominal pain since in the morning with vomiting, and also, right side of ribs pain starting in the morning, denied fever or chills,

## 2024-08-18 NOTE — ED ADULT TRIAGE NOTE - WEIGHT IN LBS
You can access the FollowMyHealth Patient Portal offered by [facility] by registering at the following website: [email or web address]. By joining [employer]’s FollowMyHealth portal, you will also be able to view your health information using other applications (apps) compatible with our system.
325.4

## 2024-08-18 NOTE — ED PROVIDER NOTE - PHYSICAL EXAMINATION
Restorative Specialist Mobility Note       Activity: Chair, Dangle, Stand at bedside, Turn, Ambulate in room, Ambulate in dunaway     Assistive Device: Front wheel walker     Ambulation Response:  Tolerated fairly well  Repositioned: Sitting, Up in chair See MDM

## 2024-08-18 NOTE — H&P ADULT - NSHPLABSRESULTS_GEN_ALL_CORE
14.1   10.80 )-----------( 260      ( 18 Aug 2024 10:50 )             42.7   08-18    140  |  105  |  19<H>  ----------------------------<  127<H>  4.4   |  27  |  1.20    Ca    9.4      18 Aug 2024 10:50    TPro  7.4  /  Alb  3.6  /  TBili  0.8  /  DBili  x   /  AST  71<H>  /  ALT  59  /  AlkPhos  96  08-18    < from: US Abdomen Upper Quadrant Right (08.18.24 @ 11:56) >      ACC: [id number] EXAM:  US ABDOMEN RT UPR QUADRANT   ORDERED BY: FARRUKH WILEY     PROCEDURE DATE:  08/18/2024          INTERPRETATION:  CLINICAL INFORMATION: Epigastric abdominal pain.    COMPARISON: None available.    TECHNIQUE: Sonography of theright upper quadrant.    FINDINGS:  Evaluation is somewhat limited due to patient body habitus.    Liver: The liver is enlarged and diffusely increased in echogenicity. The   surface contour is grossly smooth. No discrete hepatic lesion identified.  Bile ducts: Normal caliber. Common bile duct measures 5 mm.  Gallbladder: There are multiple gallstones within the gallbladder and the   gallbladder is thick-walled though nondistended. There is a reported   negative sonographic Cuadra sign.  Pancreas: Visualized portions are within normal limits.  Right kidney: 11.7 cm. No hydronephrosis.  Ascites: None.  IVC: Visualized portions are within normal limits.    IMPRESSION:  Cholelithiasis with diffuse gallbladder wall thickening though without   distention or sonographic Cuadra sign, equivocal for acute cholecystitis.   Consider nuclear medicine HIDA scan as clinically indicated.    Mild hepatomegaly with diffuse increased echogenicity of the liver,   compatible with diffuse hepatocellular disease such as steatosis.        --- End of Report ---            DASHAWN BENSON MD; Attending Radiologist  This document has been electronically signed. Aug 18 2024  1:22PM    < end of copied text >

## 2024-08-18 NOTE — H&P ADULT - NSHPPHYSICALEXAM_GEN_ALL_CORE
T(C): 36.4 (08-18-24 @ 09:51), Max: 36.4 (08-18-24 @ 09:51)  HR: 81 (08-18-24 @ 09:51) (81 - 81)  BP: 115/76 (08-18-24 @ 09:51) (115/76 - 115/76)  RR: 17 (08-18-24 @ 09:51) (17 - 17)  SpO2: 98% (08-18-24 @ 09:51) (98% - 98%)    CONSTITUTIONAL: Well groomed, no apparent distress  RESP: No respiratory distress, no use of accessory muscles  GI: Soft, ND, slight epigastric/ruq tendreness no murphys sign present, no rebound, no guarding; no palpable masses; no hepatosplenomegaly; no hernia palpated

## 2024-08-18 NOTE — ED ADULT NURSE NOTE - PAIN: PRESENCE, MLM
Chief Complaint   Patient presents with    Skin Check     Chest, left arm, lower lip, & right pointer finger        There were no vitals filed for this visit.  Wt Readings from Last 1 Encounters:   06/19/24 91.6 kg (202 lb)       Shawna Hansen LPN .................6/20/2024    
complains of pain/discomfort

## 2024-08-19 ENCOUNTER — RESULT REVIEW (OUTPATIENT)
Age: 34
End: 2024-08-19

## 2024-08-19 LAB
ABO RH CONFIRMATION: SIGNIFICANT CHANGE UP
ANION GAP SERPL CALC-SCNC: 8 MMOL/L — SIGNIFICANT CHANGE UP (ref 5–17)
BASOPHILS # BLD AUTO: 0.03 K/UL — SIGNIFICANT CHANGE UP (ref 0–0.2)
BASOPHILS NFR BLD AUTO: 0.6 % — SIGNIFICANT CHANGE UP (ref 0–2)
BUN SERPL-MCNC: 13 MG/DL — SIGNIFICANT CHANGE UP (ref 7–18)
CALCIUM SERPL-MCNC: 9.1 MG/DL — SIGNIFICANT CHANGE UP (ref 8.4–10.5)
CHLORIDE SERPL-SCNC: 105 MMOL/L — SIGNIFICANT CHANGE UP (ref 96–108)
CO2 SERPL-SCNC: 28 MMOL/L — SIGNIFICANT CHANGE UP (ref 22–31)
CREAT SERPL-MCNC: 1.09 MG/DL — SIGNIFICANT CHANGE UP (ref 0.5–1.3)
EGFR: 90 ML/MIN/1.73M2 — SIGNIFICANT CHANGE UP
EOSINOPHIL # BLD AUTO: 0.11 K/UL — SIGNIFICANT CHANGE UP (ref 0–0.5)
EOSINOPHIL NFR BLD AUTO: 2.1 % — SIGNIFICANT CHANGE UP (ref 0–6)
GLUCOSE SERPL-MCNC: 83 MG/DL — SIGNIFICANT CHANGE UP (ref 70–99)
HCT VFR BLD CALC: 40.6 % — SIGNIFICANT CHANGE UP (ref 39–50)
HGB BLD-MCNC: 13 G/DL — SIGNIFICANT CHANGE UP (ref 13–17)
IMM GRANULOCYTES NFR BLD AUTO: 0.4 % — SIGNIFICANT CHANGE UP (ref 0–0.9)
LYMPHOCYTES # BLD AUTO: 1.49 K/UL — SIGNIFICANT CHANGE UP (ref 1–3.3)
LYMPHOCYTES # BLD AUTO: 28.6 % — SIGNIFICANT CHANGE UP (ref 13–44)
MCHC RBC-ENTMCNC: 27 PG — SIGNIFICANT CHANGE UP (ref 27–34)
MCHC RBC-ENTMCNC: 32 GM/DL — SIGNIFICANT CHANGE UP (ref 32–36)
MCV RBC AUTO: 84.4 FL — SIGNIFICANT CHANGE UP (ref 80–100)
MONOCYTES # BLD AUTO: 0.42 K/UL — SIGNIFICANT CHANGE UP (ref 0–0.9)
MONOCYTES NFR BLD AUTO: 8.1 % — SIGNIFICANT CHANGE UP (ref 2–14)
NEUTROPHILS # BLD AUTO: 3.14 K/UL — SIGNIFICANT CHANGE UP (ref 1.8–7.4)
NEUTROPHILS NFR BLD AUTO: 60.2 % — SIGNIFICANT CHANGE UP (ref 43–77)
NRBC # BLD: 0 /100 WBCS — SIGNIFICANT CHANGE UP (ref 0–0)
PLATELET # BLD AUTO: 206 K/UL — SIGNIFICANT CHANGE UP (ref 150–400)
POTASSIUM SERPL-MCNC: 3.6 MMOL/L — SIGNIFICANT CHANGE UP (ref 3.5–5.3)
POTASSIUM SERPL-SCNC: 3.6 MMOL/L — SIGNIFICANT CHANGE UP (ref 3.5–5.3)
RBC # BLD: 4.81 M/UL — SIGNIFICANT CHANGE UP (ref 4.2–5.8)
RBC # FLD: 12.5 % — SIGNIFICANT CHANGE UP (ref 10.3–14.5)
SODIUM SERPL-SCNC: 141 MMOL/L — SIGNIFICANT CHANGE UP (ref 135–145)
WBC # BLD: 5.21 K/UL — SIGNIFICANT CHANGE UP (ref 3.8–10.5)
WBC # FLD AUTO: 5.21 K/UL — SIGNIFICANT CHANGE UP (ref 3.8–10.5)

## 2024-08-19 PROCEDURE — 88304 TISSUE EXAM BY PATHOLOGIST: CPT | Mod: 26

## 2024-08-19 PROCEDURE — 47562 LAPAROSCOPIC CHOLECYSTECTOMY: CPT | Mod: 22

## 2024-08-19 PROCEDURE — 47562 LAPAROSCOPIC CHOLECYSTECTOMY: CPT | Mod: AS

## 2024-08-19 DEVICE — ARISTA 3GR: Type: IMPLANTABLE DEVICE | Status: FUNCTIONAL

## 2024-08-19 RX ORDER — ONDANSETRON 2 MG/ML
4 INJECTION, SOLUTION INTRAMUSCULAR; INTRAVENOUS ONCE
Refills: 0 | Status: DISCONTINUED | OUTPATIENT
Start: 2024-08-19 | End: 2024-08-19

## 2024-08-19 RX ORDER — HEPARIN SODIUM,BOVINE 1000/ML
5000 VIAL (ML) INJECTION EVERY 8 HOURS
Refills: 0 | Status: DISCONTINUED | OUTPATIENT
Start: 2024-08-20 | End: 2024-08-20

## 2024-08-19 RX ORDER — OXYCODONE HYDROCHLORIDE 5 MG/1
5 TABLET ORAL EVERY 4 HOURS
Refills: 0 | Status: DISCONTINUED | OUTPATIENT
Start: 2024-08-19 | End: 2024-08-20

## 2024-08-19 RX ORDER — ACETAMINOPHEN 325 MG/1
650 TABLET ORAL EVERY 6 HOURS
Refills: 0 | Status: DISCONTINUED | OUTPATIENT
Start: 2024-08-19 | End: 2024-08-20

## 2024-08-19 RX ORDER — HYDROMORPHONE HYDROCHLORIDE 2 MG/1
0.5 TABLET ORAL
Refills: 0 | Status: DISCONTINUED | OUTPATIENT
Start: 2024-08-19 | End: 2024-08-19

## 2024-08-19 RX ORDER — ONDANSETRON 2 MG/ML
4 INJECTION, SOLUTION INTRAMUSCULAR; INTRAVENOUS EVERY 6 HOURS
Refills: 0 | Status: DISCONTINUED | OUTPATIENT
Start: 2024-08-19 | End: 2024-08-20

## 2024-08-19 RX ORDER — HYDROMORPHONE HYDROCHLORIDE 2 MG/1
1 TABLET ORAL
Refills: 0 | Status: DISCONTINUED | OUTPATIENT
Start: 2024-08-19 | End: 2024-08-19

## 2024-08-19 RX ADMIN — HYDROMORPHONE HYDROCHLORIDE 0.5 MILLIGRAM(S): 2 TABLET ORAL at 16:47

## 2024-08-19 RX ADMIN — OXYCODONE HYDROCHLORIDE 5 MILLIGRAM(S): 5 TABLET ORAL at 19:58

## 2024-08-19 RX ADMIN — OXYCODONE HYDROCHLORIDE 5 MILLIGRAM(S): 5 TABLET ORAL at 18:58

## 2024-08-19 RX ADMIN — HYDROMORPHONE HYDROCHLORIDE 0.5 MILLIGRAM(S): 2 TABLET ORAL at 17:06

## 2024-08-19 NOTE — BRIEF OPERATIVE NOTE - NSICDXBRIEFPROCEDURE_GEN_ALL_CORE_FT
PROCEDURES:  Cholecystectomy, laparoscopic, with possible cholangiogram 19-Aug-2024 16:04:59  Laureano Wilkins

## 2024-08-19 NOTE — PROGRESS NOTE ADULT - SUBJECTIVE AND OBJECTIVE BOX
INTERVAL HPI/OVERNIGHT EVENTS:  seen and examined   Pt resting comfortably. No acute complaints.   npo for or     MEDICATIONS  (STANDING):  lactated ringers. 1000 milliLiter(s) (175 mL/Hr) IV Continuous <Continuous>    MEDICATIONS  (PRN):  ondansetron Injectable 4 milliGRAM(s) IV Push every 6 hours PRN Nausea      Vital Signs Last 24 Hrs  T(C): 36.6 (19 Aug 2024 05:05), Max: 36.8 (18 Aug 2024 15:51)  T(F): 97.9 (19 Aug 2024 05:05), Max: 98.2 (18 Aug 2024 15:51)  HR: 66 (19 Aug 2024 05:05) (66 - 83)  BP: 114/72 (19 Aug 2024 05:05) (111/73 - 153/88)  BP(mean): 110 (18 Aug 2024 19:34) (110 - 110)  RR: 16 (19 Aug 2024 05:05) (16 - 17)  SpO2: 97% (19 Aug 2024 05:05) (97% - 98%)    Parameters below as of 19 Aug 2024 05:05  Patient On (Oxygen Delivery Method): room air        Physical:  General: NAD.  Respirations: Unlabored  Abdomen: Soft nondistended, nontender. No rebound, no guarding, no peritonitis     I&O's Detail      LABS:                        13.0   5.21  )-----------( 206      ( 19 Aug 2024 06:00 )             40.6             08-19    141  |  105  |  13  ----------------------------<  83  3.6   |  28  |  1.09    Ca    9.1      19 Aug 2024 06:00    TPro  7.4  /  Alb  3.6  /  TBili  0.8  /  DBili  x   /  AST  71<H>  /  ALT  59  /  AlkPhos  96  08-18

## 2024-08-19 NOTE — PACU DISCHARGE NOTE - THE ANESTHESIA ORDERS USED IN THE PACU ORDER SET WILL BE DISCONTINUED UPON TRANSFER OF THIS PATIENT
Has not been an due to COVID.  Behind all her follow-ups.  Last year quite sometime ago.  No new CVA since last hospitalization.  Blood pressure is doing okay.  Her eye exam is past due.  Last was October 2018.  She is anticoagulated with Xarelto no bleeding problems.  Using Tresiba and Ozempic.  Morning sugars [id number].  Hyperlipidemia check is due.  Breast cancer on her medication.  No recurrences.  Son accompanying her today.  Prior stroke was cardioembolic with hemorrhagic conversion done another stroke after that before she was able to go back on anticoagulation.  No palpitations.  No bleeding episodes from her anticoagulants.  Had a cardioembolic stroke which the end of developed hemorrhage.  And then had a another stroke before she could be put on anticoagulation due to 1st hemorrhagic episode.  She has been doing quite well.  No chest pain or palpitations.  Using cane for ambulation otherwise no deficits.    Physical examination vital signs are noted.  Alert and conversant.  No acute distress alert oriented neck without bruit supple no adenopathy.  Chest clear to auscultation no wheezes crackles no dyspnea heart regular rate rhythm without murmur rub abdomen soft nontender neuro seemingly intact.  Speech normal with the will.  Extremities without edema positive pedal pulses.   symmetrical.  Gait is good.    Impression diabetes mellitus type 2 on insulin.  Cardioembolic stroke with hemorrhagic conversion.  Anticoagulation.  Hyperlipidemia.  Use of cane.  Breast cancer.  Atrial fibrillation.    Plan CBC CMP lipids hemoglobin A1c and microalbumin ordered.  Continue her anticoagulants and other medications.  Further adjust months after labs are done.  Eye exam in the next few weeks if possible.     Statement Selected

## 2024-08-19 NOTE — PROGRESS NOTE ADULT - ASSESSMENT
34M with acute cholecystitis    -or today for lap citlaly  -preop labs  -npo/ivf  -iv abx  -pain control/nausea control prn  -consent to be obtained   -Discussed with Dr. Rivera

## 2024-08-19 NOTE — CHART NOTE - NSCHARTNOTEFT_GEN_A_CORE
Patient seen and examined at bedside, now s/p lap citlaly. Patient overall feeling well and without acute events. Patient ambulating only to bathroom since OR, pain well controlled on current regimen. Patient has not yet received a tray, tolerating water. No specific complaints    Vital Signs Last 24 Hrs  T(C): 37.9 (20 Aug 2024 05:37), Max: 37.9 (20 Aug 2024 05:37)  T(F): 100.3 (20 Aug 2024 05:37), Max: 100.3 (20 Aug 2024 05:37)  HR: 86 (20 Aug 2024 05:37) (59 - 89)  BP: 126/81 (20 Aug 2024 05:37) (110/71 - 134/75)  BP(mean): 96 (20 Aug 2024 05:37) (83 - 96)  RR: 18 (20 Aug 2024 05:37) (13 - 25)  SpO2: 93% (20 Aug 2024 05:37) (93% - 100%)    Parameters below as of 20 Aug 2024 05:37  Patient On (Oxygen Delivery Method): room air      Physical  Gen- Lying in bed, NAD  Resp- Normal work of breathing on RA  Abd- Soft, mildly tender near port sites, nondistended; dressings overlying port sites c/d/i    34M s/p trinidad boucher progressing well  - Regular diet  -ambulation, SCDs while in bed  - multimodal pain control  - IS Patient seen and examined at bedside, now s/p lap citlaly. Patient overall feeling well and without acute events. Patient has not ambulated since OR, pain well controlled on current regimen and primarily epigastric. Patient has not yet received a tray, tolerating water. No specific complaints.    Vital Signs Last 24 Hrs  T(C): 37.9 (20 Aug 2024 05:37), Max: 37.9 (20 Aug 2024 05:37)  T(F): 100.3 (20 Aug 2024 05:37), Max: 100.3 (20 Aug 2024 05:37)  HR: 86 (20 Aug 2024 05:37) (59 - 89)  BP: 126/81 (20 Aug 2024 05:37) (110/71 - 134/75)  BP(mean): 96 (20 Aug 2024 05:37) (83 - 96)  RR: 18 (20 Aug 2024 05:37) (13 - 25)  SpO2: 93% (20 Aug 2024 05:37) (93% - 100%)    Parameters below as of 20 Aug 2024 05:37  Patient On (Oxygen Delivery Method): room air      Physical  Gen- Lying in bed, NAD  Resp- Normal work of breathing on RA  Abd- Soft, mildly tender near port sites, nondistended; dermabond overlying port sites c/d/i    34M s/p lap citlaly progressing well  - Regular diet  -ambulation, SCDs while in bed  - multimodal pain control  - IS Nursing/Physical therapy

## 2024-08-20 ENCOUNTER — TRANSCRIPTION ENCOUNTER (OUTPATIENT)
Age: 34
End: 2024-08-20

## 2024-08-20 VITALS
HEART RATE: 93 BPM | RESPIRATION RATE: 18 BRPM | OXYGEN SATURATION: 97 % | TEMPERATURE: 99 F | DIASTOLIC BLOOD PRESSURE: 85 MMHG | SYSTOLIC BLOOD PRESSURE: 130 MMHG

## 2024-08-20 LAB
ALBUMIN SERPL ELPH-MCNC: 3 G/DL — LOW (ref 3.5–5)
ALP SERPL-CCNC: 113 U/L — SIGNIFICANT CHANGE UP (ref 40–120)
ALT FLD-CCNC: 84 U/L DA — HIGH (ref 10–60)
ANION GAP SERPL CALC-SCNC: 5 MMOL/L — SIGNIFICANT CHANGE UP (ref 5–17)
AST SERPL-CCNC: 52 U/L — HIGH (ref 10–40)
BILIRUB SERPL-MCNC: 0.6 MG/DL — SIGNIFICANT CHANGE UP (ref 0.2–1.2)
BUN SERPL-MCNC: 9 MG/DL — SIGNIFICANT CHANGE UP (ref 7–18)
CALCIUM SERPL-MCNC: 8.7 MG/DL — SIGNIFICANT CHANGE UP (ref 8.4–10.5)
CHLORIDE SERPL-SCNC: 104 MMOL/L — SIGNIFICANT CHANGE UP (ref 96–108)
CO2 SERPL-SCNC: 29 MMOL/L — SIGNIFICANT CHANGE UP (ref 22–31)
CREAT SERPL-MCNC: 1.12 MG/DL — SIGNIFICANT CHANGE UP (ref 0.5–1.3)
EGFR: 87 ML/MIN/1.73M2 — SIGNIFICANT CHANGE UP
GLUCOSE SERPL-MCNC: 99 MG/DL — SIGNIFICANT CHANGE UP (ref 70–99)
HCT VFR BLD CALC: 40 % — SIGNIFICANT CHANGE UP (ref 39–50)
HGB BLD-MCNC: 13.1 G/DL — SIGNIFICANT CHANGE UP (ref 13–17)
MCHC RBC-ENTMCNC: 27.3 PG — SIGNIFICANT CHANGE UP (ref 27–34)
MCHC RBC-ENTMCNC: 32.8 GM/DL — SIGNIFICANT CHANGE UP (ref 32–36)
MCV RBC AUTO: 83.3 FL — SIGNIFICANT CHANGE UP (ref 80–100)
NRBC # BLD: 0 /100 WBCS — SIGNIFICANT CHANGE UP (ref 0–0)
PLATELET # BLD AUTO: 229 K/UL — SIGNIFICANT CHANGE UP (ref 150–400)
POTASSIUM SERPL-MCNC: 3.8 MMOL/L — SIGNIFICANT CHANGE UP (ref 3.5–5.3)
POTASSIUM SERPL-SCNC: 3.8 MMOL/L — SIGNIFICANT CHANGE UP (ref 3.5–5.3)
PROT SERPL-MCNC: 6.3 G/DL — SIGNIFICANT CHANGE UP (ref 6–8.3)
RBC # BLD: 4.8 M/UL — SIGNIFICANT CHANGE UP (ref 4.2–5.8)
RBC # FLD: 12.4 % — SIGNIFICANT CHANGE UP (ref 10.3–14.5)
SODIUM SERPL-SCNC: 138 MMOL/L — SIGNIFICANT CHANGE UP (ref 135–145)
WBC # BLD: 7.86 K/UL — SIGNIFICANT CHANGE UP (ref 3.8–10.5)
WBC # FLD AUTO: 7.86 K/UL — SIGNIFICANT CHANGE UP (ref 3.8–10.5)

## 2024-08-20 PROCEDURE — C1889: CPT

## 2024-08-20 PROCEDURE — 85730 THROMBOPLASTIN TIME PARTIAL: CPT

## 2024-08-20 PROCEDURE — 85027 COMPLETE CBC AUTOMATED: CPT

## 2024-08-20 PROCEDURE — 86850 RBC ANTIBODY SCREEN: CPT

## 2024-08-20 PROCEDURE — 80053 COMPREHEN METABOLIC PANEL: CPT

## 2024-08-20 PROCEDURE — 76705 ECHO EXAM OF ABDOMEN: CPT

## 2024-08-20 PROCEDURE — 85025 COMPLETE CBC W/AUTO DIFF WBC: CPT

## 2024-08-20 PROCEDURE — 83690 ASSAY OF LIPASE: CPT

## 2024-08-20 PROCEDURE — 99285 EMERGENCY DEPT VISIT HI MDM: CPT | Mod: 25

## 2024-08-20 PROCEDURE — 85610 PROTHROMBIN TIME: CPT

## 2024-08-20 PROCEDURE — 86900 BLOOD TYPING SEROLOGIC ABO: CPT

## 2024-08-20 PROCEDURE — 93005 ELECTROCARDIOGRAM TRACING: CPT

## 2024-08-20 PROCEDURE — 80048 BASIC METABOLIC PNL TOTAL CA: CPT

## 2024-08-20 PROCEDURE — 88304 TISSUE EXAM BY PATHOLOGIST: CPT

## 2024-08-20 PROCEDURE — 36415 COLL VENOUS BLD VENIPUNCTURE: CPT

## 2024-08-20 PROCEDURE — 96375 TX/PRO/DX INJ NEW DRUG ADDON: CPT

## 2024-08-20 PROCEDURE — 86901 BLOOD TYPING SEROLOGIC RH(D): CPT

## 2024-08-20 PROCEDURE — 96361 HYDRATE IV INFUSION ADD-ON: CPT

## 2024-08-20 PROCEDURE — 81003 URINALYSIS AUTO W/O SCOPE: CPT

## 2024-08-20 PROCEDURE — 96374 THER/PROPH/DIAG INJ IV PUSH: CPT

## 2024-08-20 RX ADMIN — OXYCODONE HYDROCHLORIDE 5 MILLIGRAM(S): 5 TABLET ORAL at 12:28

## 2024-08-20 RX ADMIN — ACETAMINOPHEN 650 MILLIGRAM(S): 325 TABLET ORAL at 06:52

## 2024-08-20 RX ADMIN — Medication 5000 UNIT(S): at 14:11

## 2024-08-20 RX ADMIN — OXYCODONE HYDROCHLORIDE 5 MILLIGRAM(S): 5 TABLET ORAL at 13:28

## 2024-08-20 RX ADMIN — OXYCODONE HYDROCHLORIDE 5 MILLIGRAM(S): 5 TABLET ORAL at 01:55

## 2024-08-20 RX ADMIN — Medication 5000 UNIT(S): at 05:43

## 2024-08-20 RX ADMIN — ACETAMINOPHEN 650 MILLIGRAM(S): 325 TABLET ORAL at 05:43

## 2024-08-20 RX ADMIN — OXYCODONE HYDROCHLORIDE 5 MILLIGRAM(S): 5 TABLET ORAL at 00:55

## 2024-08-20 NOTE — DISCHARGE NOTE PROVIDER - NSDCMRMEDTOKEN_GEN_ALL_CORE_FT
acetaminophen 325 mg oral tablet: 3 tab(s) orally every 8 hours, As Needed for mild pain.  cyclobenzaprine 10 mg oral tablet: 1 tab(s) orally 3 times a day, As needed, Muscle Spasm  dexamethasone 1 mg oral tablet: Steroid Taper start on 1/19/2020 at 4pm  Take 3 Tab every 6 hours for 4 doses  Take 2 Tab every 6 hours for 4 doses  Take 1 Tab every 6 hours for 4 doses  Lyrica 75 mg oral capsule: 1 cap(s) orally 2 times a day  oxyCODONE 5 mg oral tablet: 1 tab(s) orally every 4 hours, As needed, Severe Pain (4 - 6) MDD:6  senna oral tablet: 2 tab(s) orally once a day (at bedtime)  traMADol 50 mg oral tablet: 1 tab(s) orally every 6 hours, As needed, Moderate Pain (1 - 3) MDD:4

## 2024-08-20 NOTE — DISCHARGE NOTE PROVIDER - CARE PROVIDER_API CALL
Tyrese Rivera [facility]  [address], [address]  [place] [address]  Phone: [phone]  Fax: [phone]  Follow Up Time:

## 2024-08-20 NOTE — DISCHARGE NOTE PROVIDER - NSDCCPCAREPLAN_GEN_ALL_CORE_FT
PRINCIPAL DISCHARGE DIAGNOSIS  Diagnosis: Cholecystitis, unspecified  Assessment and Plan of Treatment: Please follow-up with your surgeon in 1 week. Drink plenty of fluids and rest as needed. Call for any fever over 101, nausea, vomiting, severe pain, no passing of gas or bowel movement.   DIET  You may resume your regular diet as normal.   SURGICAL SITES  Remove outer dressing and keep white steri-strips in place allowing them to fall off on their own. You may shower 48 hours post-operatively but do not bathe or soak in the water for 1-2 weeks; pat dry. If you notice any signs of surgical site infection (ie. redness, swelling, pain, pus drainage), please seek medical care immediately.  ACTIVITY  Do not lift anything heavier than 10 pounds for 2 weeks (2-3 months for hernia, no exercise for 2 weeks) and avoid strenuous activity for 4-6 weeks.   PAIN CONTROL  You may take Motrin 600mg (with food) or Tylenol 650mg as needed for mild pain. Stagger one medication 3 hours after the other for maximum pain control. Maximum daily dose of Tylenol should not exceed 4grams/day.

## 2024-08-20 NOTE — DISCHARGE NOTE NURSING/CASE MANAGEMENT/SOCIAL WORK - PATIENT PORTAL LINK FT
You can access the FollowMyHealth Patient Portal offered by [facility] by registering at the following website: [email or web address]. By joining [employer]’s FollowMyHealth portal, you will also be able to view your health information using other applications (apps) compatible with our system.

## 2024-08-20 NOTE — DISCHARGE NOTE PROVIDER - HOSPITAL COURSE
34M with no sig pmhx coming to [facility] with 1 day of epigastric/RUQ pain associated with right flank pain, nausea, and vomiting. Pt states he woke up at 2am this morning with right sided flank pain which developed into epigastric/RUQ "fullness" and discomfort. He subsequently developed nausea and had a few episodes of NBNB emesis. Pt denies fever or chills as home. He states he had a similar episode of pain about a month ago with RUQ sharp pains which lasted about a day and he did not seek medical care for. The patient states he had 2 slices of pizza for dinner last night. Pt has extensive family history of cholecystitis with mother, sister, and multiple grandparents having cholecystectomy. Pt denies any other complaints at this time. Pt denies any abdominal surgery in the past.   Underwent lap citlaly 8/19 with no reported postoperative complications.   On day of discharge, patient's vital signs were stable,  they were tolerating diet, ambulating, and voiding at baseline. They were deemed medically stable for discharge home. Instructions were provided to follow up with .

## 2024-08-20 NOTE — PROGRESS NOTE ADULT - NSPROGADDITIONALINFOA_GEN_ALL_CORE
Surgical Attending    POD 1 Lap cholecystectomy for gangrenous cholecystitis    Tolerating diet, pain well controlled  labs unremarkable  incisions cdi, abdomen soft  Patient may be discharged today  F/u in 2 weeks

## 2024-08-21 LAB — SURGICAL PATHOLOGY STUDY: SIGNIFICANT CHANGE UP

## 2024-09-04 ENCOUNTER — NON-APPOINTMENT (OUTPATIENT)
Age: 34
End: 2024-09-04

## 2024-09-05 ENCOUNTER — APPOINTMENT (OUTPATIENT)
Dept: SURGERY | Facility: CLINIC | Age: 34
End: 2024-09-05

## 2024-09-05 VITALS
BODY MASS INDEX: 45.1 KG/M2 | SYSTOLIC BLOOD PRESSURE: 151 MMHG | OXYGEN SATURATION: 97 % | DIASTOLIC BLOOD PRESSURE: 81 MMHG | WEIGHT: 315 LBS | HEART RATE: 78 BPM | HEIGHT: 70 IN

## 2024-09-05 DIAGNOSIS — Z78.9 OTHER SPECIFIED HEALTH STATUS: ICD-10-CM

## 2024-09-05 DIAGNOSIS — Z87.39 PERSONAL HISTORY OF OTHER DISEASES OF THE MUSCULOSKELETAL SYSTEM AND CONNECTIVE TISSUE: ICD-10-CM

## 2024-09-05 DIAGNOSIS — Z87.19 PERSONAL HISTORY OF OTHER DISEASES OF THE DIGESTIVE SYSTEM: ICD-10-CM

## 2024-09-05 DIAGNOSIS — Z86.69 PERSONAL HISTORY OF OTHER DISEASES OF THE NERVOUS SYSTEM AND SENSE ORGANS: ICD-10-CM

## 2024-09-05 DIAGNOSIS — Z82.69 FAMILY HISTORY OF OTHER DISEASES OF THE MUSCULOSKELETAL SYSTEM AND CONNECTIVE TISSUE: ICD-10-CM

## 2024-09-05 DIAGNOSIS — Z86.73 PERSONAL HISTORY OF TRANSIENT ISCHEMIC ATTACK (TIA), AND CEREBRAL INFARCTION W/OUT RESIDUAL DEFICITS: ICD-10-CM

## 2024-09-05 DIAGNOSIS — Z87.898 PERSONAL HISTORY OF OTHER SPECIFIED CONDITIONS: ICD-10-CM

## 2024-09-05 DIAGNOSIS — Z56.0 UNEMPLOYMENT, UNSPECIFIED: ICD-10-CM

## 2024-09-05 SDOH — ECONOMIC STABILITY - INCOME SECURITY: UNEMPLOYMENT, UNSPECIFIED: Z56.0

## 2024-09-05 NOTE — ASSESSMENT
[FreeTextEntry1] : 34M s/p lap cholecystectomy for gangrenous cholecystitis   - No post op complications - advised to return PRN

## 2024-09-05 NOTE — DATA REVIEWED
[FreeTextEntry1] :   Accession:                             [id number]  Collected Date/Time:                   8/19/2024 15:30 EDT Received Date/Time:                    8/20/2024 08:00 EDT  Surgical Pathology Report - Auth (Verified)  Specimen(s) Submitted 1  Gallbladder  Final Diagnosis Gallbladder; laparoscopic cholecystectomy: -   Chronic cholecystitis and cholelithiasis  Verified by: Rose Marie Pineda M.D. (Electronic Signature) Reported on: 08/21/24 14:43 EDT, [id number] Phone: [phone]   Fax: [phone] _________________________________________________________________  Clinical Information Cholecystitis   Gross Description Received:  In formalin labeled  "gallbladder" Integrity:  Disrupted, measuring 11.5 x 3.0 x 3.0 cm Cystic Duct:  Occluded, marked by a metallic clip and inked black. . Size: 0.5 cm in length and 0.5 cm in diameter Lymph Node:  Not identified Serosa:  Tan-purple and smooth Hepatic Surface:  Cauterized and shaggy Bile:  Not identified Calculi:  Multiple calculi, ranging from 0.8 cm to 1.5 cm  , faceted, green and smooth, present in the lumen and occluding the cystic duct Mucosa:  Pink-red and focally eroded Wall:  0.2 cm  thick Submitted:  Representative sections in 1 cassette: 1A: Shaved cystic duct margin and full thickness sections to include neck, body and fundus  Lawson Severe 08/20/2024 11:11 AM  Disclaimer In addition to other data that may appear on the specimen containers, all labels have been inspected to confirm the presence of the patient's name and date of birth.   Histological processing performed at [facility], [place] [address].

## 2024-09-05 NOTE — HISTORY OF PRESENT ILLNESS
[de-identified] : 34M coming in post op from lap cholecystectomy done on 8/19/24. He presents for his routine post op visit. tolerating diet well, no diarrhea no fevers. pain is minimal, incisions healing well.  [de-identified] : no new complaints
